# Patient Record
Sex: FEMALE | Race: WHITE | NOT HISPANIC OR LATINO
[De-identification: names, ages, dates, MRNs, and addresses within clinical notes are randomized per-mention and may not be internally consistent; named-entity substitution may affect disease eponyms.]

---

## 2021-01-06 ENCOUNTER — APPOINTMENT (OUTPATIENT)
Dept: ENDOCRINOLOGY | Facility: CLINIC | Age: 66
End: 2021-01-06
Payer: COMMERCIAL

## 2021-01-06 VITALS
BODY MASS INDEX: 22.15 KG/M2 | RESPIRATION RATE: 17 BRPM | HEART RATE: 119 BPM | TEMPERATURE: 98.2 F | OXYGEN SATURATION: 98 % | DIASTOLIC BLOOD PRESSURE: 70 MMHG | HEIGHT: 63 IN | SYSTOLIC BLOOD PRESSURE: 110 MMHG | WEIGHT: 125 LBS

## 2021-01-06 LAB — GLUCOSE BLDC GLUCOMTR-MCNC: 484

## 2021-01-06 PROCEDURE — 99205 OFFICE O/P NEW HI 60 MIN: CPT | Mod: 25

## 2021-01-06 PROCEDURE — 99072 ADDL SUPL MATRL&STAF TM PHE: CPT

## 2021-01-06 PROCEDURE — 82962 GLUCOSE BLOOD TEST: CPT

## 2021-01-06 PROCEDURE — 36415 COLL VENOUS BLD VENIPUNCTURE: CPT

## 2021-01-06 NOTE — HISTORY OF PRESENT ILLNESS
[FreeTextEntry1] : HISTORY OF PRESENT ILLNESS. \par \par Ms. HERMAN was diagnosed with Diabetes Mellitus Type 2 for more than 30 years. \par She reports history HTN, dyslipidemia, and denies CAD,  known complications of retinopathy, nephropathy, or neuropathy. \par Previously under care of [facility], and as per records she used to take Levemir, Victoza, Prandin\par She is presently on Admelog 3 un tid, Metformin 500 mg bid, Tradjenta 5 mg qd, Zocor 10 mg\par She stopped checking her blood sugars at home. \par Fingerstick glucose in the office today is 484 mg/dL 4 hours after eating. \par Diet: not following ADA\par Exercise: walking\par \par Lab review: no recent labs\par \par Last dilated eye - 2020\par Last podiatry visit  - none\par Last cardiology evaluation -none\par Last stress test - none\par Last 2-D Echo - none\par Last nephrology evaluation - none\par Last neurology evaluation-none\par

## 2021-01-06 NOTE — ASSESSMENT
[Diabetes Foot Care] : diabetes foot care [Long Term Vascular Complications] : long term vascular complications of diabetes [Carbohydrate Consistent Diet] : carbohydrate consistent diet [Importance of Diet and Exercise] : importance of diet and exercise to improve glycemic control, achieve weight loss and improve cardiovascular health [Exercise/Effect on Glucose] : exercise/effect on glucose [Hypoglycemia Management] : hypoglycemia management [Glucagon Use] : glucagon use [Ketone Testing] : ketone testing [Action and use of Insulin] : action and use of short and long-acting insulin [Self Monitoring of Blood Glucose] : self monitoring of blood glucose [Insulin Self-Administration] : insulin self-administration [Injection Technique, Storage, Sharps Disposal] : injection technique, storage, and sharps disposal [Sick-Day Management] : sick-day management [Retinopathy Screening] : Patient was referred to ophthalmology for retinopathy screening [Diabetic Medications] : Risks and benefits of diabetic medications were discussed [FreeTextEntry1] : Current approaches to diabetes management are discussed with the patient. \par Target ranges for blood sugar, blood pressure and cholesterol reviewed, and risk reduction strategies verified. \par Hypoglycemia precautions reviewed with the patient. \par Suggested extensive diabetes education program, including nutritional and diabetes teaching and evaluation. \par Proper dietary restrictions and exercise routines discussed. \par Glucometer/SMBG and log book charting discussed.\par - Admelog 7 un x 1 stat\par - Basaglar 14 un HS\par - Admelog  tid ac (0-0-2-9-8-6-10-11-12)\par - continue metformin 500 mg bid for now, until the results of blood work are back. Will likely uptitrate the dose after\par - Tradjenta 5 mg qd\par - will reconsider other diabetic classes post labs\par - apply for [facility] and see CDE\par - continue Zocor 10 mg HS\par - thyroid US\par - obtain prior DXA records (done in 2020)\par RTC 3 months, to see CDE in between

## 2021-01-07 LAB
25(OH)D3 SERPL-MCNC: 27.8 NG/ML
ALBUMIN SERPL ELPH-MCNC: 4.6 G/DL
ALP BLD-CCNC: 144 U/L
ALT SERPL-CCNC: 9 U/L
ANION GAP SERPL CALC-SCNC: 18 MMOL/L
AST SERPL-CCNC: 10 U/L
BASOPHILS # BLD AUTO: 0.08 K/UL
BASOPHILS NFR BLD AUTO: 0.9 %
BILIRUB SERPL-MCNC: 0.2 MG/DL
BUN SERPL-MCNC: 31 MG/DL
C PEPTIDE SERPL-MCNC: 0.8 NG/ML
CALCIUM SERPL-MCNC: 10.2 MG/DL
CHLORIDE SERPL-SCNC: 93 MMOL/L
CHOLEST SERPL-MCNC: 224 MG/DL
CO2 SERPL-SCNC: 25 MMOL/L
CREAT SERPL-MCNC: 1.34 MG/DL
CREAT SPEC-SCNC: 60 MG/DL
EOSINOPHIL # BLD AUTO: 0.08 K/UL
EOSINOPHIL NFR BLD AUTO: 0.9 %
ESTIMATED AVERAGE GLUCOSE: >398 MG/DL
FOLATE SERPL-MCNC: 14.3 NG/ML
FRUCTOSAMINE SERPL-MCNC: 911 UMOL/L
GLUCOSE SERPL-MCNC: 477 MG/DL
HBA1C MFR BLD HPLC: >15.5 %
HCT VFR BLD CALC: 40.8 %
HDLC SERPL-MCNC: 69 MG/DL
HGB BLD-MCNC: 13.3 G/DL
IMM GRANULOCYTES NFR BLD AUTO: 0.2 %
LDLC SERPL CALC-MCNC: 99 MG/DL
LYMPHOCYTES # BLD AUTO: 1.51 K/UL
LYMPHOCYTES NFR BLD AUTO: 17.6 %
MAN DIFF?: NORMAL
MCHC RBC-ENTMCNC: 30.8 PG
MCHC RBC-ENTMCNC: 32.6 GM/DL
MCV RBC AUTO: 94.4 FL
MICROALBUMIN 24H UR DL<=1MG/L-MCNC: <1.2 MG/DL
MICROALBUMIN/CREAT 24H UR-RTO: NORMAL MG/G
MONOCYTES # BLD AUTO: 0.67 K/UL
MONOCYTES NFR BLD AUTO: 7.8 %
NEUTROPHILS # BLD AUTO: 6.22 K/UL
NEUTROPHILS NFR BLD AUTO: 72.6 %
NONHDLC SERPL-MCNC: 154 MG/DL
PLATELET # BLD AUTO: 274 K/UL
POTASSIUM SERPL-SCNC: 3.9 MMOL/L
PROT SERPL-MCNC: 7.3 G/DL
RBC # BLD: 4.32 M/UL
RBC # FLD: 13.1 %
SODIUM SERPL-SCNC: 136 MMOL/L
T4 FREE SERPL-MCNC: 1.2 NG/DL
THYROGLOB AB SERPL-ACNC: <20 IU/ML
THYROPEROXIDASE AB SERPL IA-ACNC: 13.1 IU/ML
TRIGL SERPL-MCNC: 276 MG/DL
TSH SERPL-ACNC: 1.31 UIU/ML
VIT B12 SERPL-MCNC: 723 PG/ML
WBC # FLD AUTO: 8.58 K/UL

## 2021-01-10 LAB — PANC ISLET CELL AB SER QL: NORMAL

## 2021-01-11 LAB — GAD65 AB SER-MCNC: 0.01 NMOL/L

## 2021-01-12 RX ORDER — INSULIN GLARGINE 100 [IU]/ML
100 INJECTION, SOLUTION SUBCUTANEOUS
Qty: 3 | Refills: 5 | Status: DISCONTINUED | COMMUNITY
Start: 2021-01-06 | End: 2021-01-12

## 2021-01-15 LAB — ZINC TRANSPORTER 8 AB: <15 U/ML

## 2021-01-27 ENCOUNTER — APPOINTMENT (OUTPATIENT)
Dept: ENDOCRINOLOGY | Facility: CLINIC | Age: 66
End: 2021-01-27
Payer: COMMERCIAL

## 2021-01-27 PROCEDURE — 99072 ADDL SUPL MATRL&STAF TM PHE: CPT

## 2021-01-27 PROCEDURE — G0108 DIAB MANAGE TRN  PER INDIV: CPT

## 2021-02-02 ENCOUNTER — APPOINTMENT (OUTPATIENT)
Dept: ENDOCRINOLOGY | Facility: CLINIC | Age: 66
End: 2021-02-02

## 2021-02-22 ENCOUNTER — NON-APPOINTMENT (OUTPATIENT)
Age: 66
End: 2021-02-22

## 2021-03-04 ENCOUNTER — NON-APPOINTMENT (OUTPATIENT)
Age: 66
End: 2021-03-04

## 2021-03-19 RX ORDER — INSULIN LISPRO 100 U/ML
100 INJECTION, SOLUTION INTRAVENOUS; SUBCUTANEOUS 3 TIMES DAILY
Refills: 0 | Status: DISCONTINUED | COMMUNITY
End: 2021-03-19

## 2021-04-13 ENCOUNTER — APPOINTMENT (OUTPATIENT)
Dept: ENDOCRINOLOGY | Facility: CLINIC | Age: 66
End: 2021-04-13
Payer: COMMERCIAL

## 2021-04-13 VITALS
TEMPERATURE: 98 F | WEIGHT: 125 LBS | RESPIRATION RATE: 17 BRPM | HEART RATE: 97 BPM | BODY MASS INDEX: 22.15 KG/M2 | DIASTOLIC BLOOD PRESSURE: 60 MMHG | SYSTOLIC BLOOD PRESSURE: 140 MMHG | OXYGEN SATURATION: 98 % | HEIGHT: 63 IN

## 2021-04-13 LAB — GLUCOSE BLDC GLUCOMTR-MCNC: 106

## 2021-04-13 PROCEDURE — 99214 OFFICE O/P EST MOD 30 MIN: CPT | Mod: 25

## 2021-04-13 PROCEDURE — 36415 COLL VENOUS BLD VENIPUNCTURE: CPT

## 2021-04-13 PROCEDURE — 82962 GLUCOSE BLOOD TEST: CPT

## 2021-04-13 PROCEDURE — 99072 ADDL SUPL MATRL&STAF TM PHE: CPT

## 2021-04-13 NOTE — HISTORY OF PRESENT ILLNESS
[FreeTextEntry1] : F/u for diabetes management\par \par *** Apr 13, 2021 ***\par \par feels fine, no c/o\par on Lantus 10 un HS,  Admelog ac B (3-4-5-5-0-8-9-10-11),  ac L+D  (7-4-0-5-9-1-10-11-12), Tradjenta 5 mg, metformin 500 mg bid\par \par log: fbs- , ac L-  , ac D- 126-173 (few episodes of upper 200's- low 300's)\par with few subjective hypo's at night\par \par Thyr US (1/23/21)- normal\par \par HISTORY OF PRESENT ILLNESS. \par \par Ms. HERMAN was diagnosed with Diabetes Mellitus Type 2 for more than 30 years. \par She reports history HTN, dyslipidemia, and denies CAD,  known complications of retinopathy, nephropathy, or neuropathy. \par Previously under care of [facility], and as per records she used to take Levemir, Victoza, Prandin\par She is presently on Admelog 3 un tid, Metformin 500 mg bid, Tradjenta 5 mg qd, Zocor 10 mg\par She stopped checking her blood sugars at home. \par Fingerstick glucose in the office today is 484 mg/dL 4 hours after eating. \par Diet: not following ADA\par Exercise: walking\par \par Lab review: no recent labs\par \par Last dilated eye - 2020\par Last podiatry visit  - none\par Last cardiology evaluation -none\par Last stress test - none\par Last 2-D Echo - none\par Last nephrology evaluation - none\par Last neurology evaluation-none\par

## 2021-04-13 NOTE — ASSESSMENT
[Diabetes Foot Care] : diabetes foot care [Long Term Vascular Complications] : long term vascular complications of diabetes [Carbohydrate Consistent Diet] : carbohydrate consistent diet [Importance of Diet and Exercise] : importance of diet and exercise to improve glycemic control, achieve weight loss and improve cardiovascular health [Exercise/Effect on Glucose] : exercise/effect on glucose [Hypoglycemia Management] : hypoglycemia management [Glucagon Use] : glucagon use [Ketone Testing] : ketone testing [Action and use of Insulin] : action and use of short and long-acting insulin [Self Monitoring of Blood Glucose] : self monitoring of blood glucose [Insulin Self-Administration] : insulin self-administration [Injection Technique, Storage, Sharps Disposal] : injection technique, storage, and sharps disposal [Sick-Day Management] : sick-day management [Retinopathy Screening] : Patient was referred to ophthalmology for retinopathy screening [Diabetic Medications] : Risks and benefits of diabetic medications were discussed [FreeTextEntry1] : DM2, uncontrolled. Minimal pancreatic reserve\par - decrease Lantus 8 un HS\par - change Admelog ac B (3-4-4-1-9-8-8-9-10), ac L (3-3-3-8-9-10-11-12-13), ac D (3-4-5-4-1-4-9-10-11)\par - continue metformin 500 mg bid for now. Might uptitrate the dose eventually\par - Tradjenta 5 mg qd\par - continue Zocor 10 mg HS\par - obtain prior DXA records (done in 2020)\par RTC 3 months, to see CDE in between

## 2021-04-16 ENCOUNTER — TRANSCRIPTION ENCOUNTER (OUTPATIENT)
Age: 66
End: 2021-04-16

## 2021-04-16 LAB
25(OH)D3 SERPL-MCNC: 22.4 NG/ML
ALBUMIN SERPL ELPH-MCNC: 4.4 G/DL
ALP BLD-CCNC: 124 U/L
ALT SERPL-CCNC: 12 U/L
ANION GAP SERPL CALC-SCNC: 12 MMOL/L
AST SERPL-CCNC: 18 U/L
BILIRUB SERPL-MCNC: 0.2 MG/DL
BUN SERPL-MCNC: 37 MG/DL
C PEPTIDE SERPL-MCNC: 0.3 NG/ML
CALCIUM SERPL-MCNC: 9.6 MG/DL
CHLORIDE SERPL-SCNC: 102 MMOL/L
CHOLEST SERPL-MCNC: 157 MG/DL
CO2 SERPL-SCNC: 24 MMOL/L
CREAT SERPL-MCNC: 1.13 MG/DL
ESTIMATED AVERAGE GLUCOSE: 163 MG/DL
FRUCTOSAMINE SERPL-MCNC: 298 UMOL/L
GLUCOSE SERPL-MCNC: 108 MG/DL
HBA1C MFR BLD HPLC: 7.3 %
HDLC SERPL-MCNC: 77 MG/DL
LDLC SERPL CALC-MCNC: 64 MG/DL
NONHDLC SERPL-MCNC: 81 MG/DL
POTASSIUM SERPL-SCNC: 5 MMOL/L
PROT SERPL-MCNC: 7.3 G/DL
SODIUM SERPL-SCNC: 139 MMOL/L
TRIGL SERPL-MCNC: 83 MG/DL
TSH SERPL-ACNC: 2.09 UIU/ML

## 2021-07-09 ENCOUNTER — RX RENEWAL (OUTPATIENT)
Age: 66
End: 2021-07-09

## 2021-08-17 ENCOUNTER — APPOINTMENT (OUTPATIENT)
Dept: ENDOCRINOLOGY | Facility: CLINIC | Age: 66
End: 2021-08-17
Payer: COMMERCIAL

## 2021-08-17 VITALS
OXYGEN SATURATION: 98 % | DIASTOLIC BLOOD PRESSURE: 70 MMHG | BODY MASS INDEX: 24.98 KG/M2 | HEART RATE: 87 BPM | RESPIRATION RATE: 17 BRPM | SYSTOLIC BLOOD PRESSURE: 140 MMHG | WEIGHT: 141 LBS | HEIGHT: 63 IN

## 2021-08-17 LAB — GLUCOSE BLDC GLUCOMTR-MCNC: 59

## 2021-08-17 PROCEDURE — 99214 OFFICE O/P EST MOD 30 MIN: CPT | Mod: 25

## 2021-08-17 PROCEDURE — 82962 GLUCOSE BLOOD TEST: CPT

## 2021-08-17 PROCEDURE — 36415 COLL VENOUS BLD VENIPUNCTURE: CPT

## 2021-08-17 RX ORDER — GLUCAGON 1 MG
1 KIT INJECTION
Qty: 2 | Refills: 1 | Status: ACTIVE | COMMUNITY
Start: 2021-08-17 | End: 1900-01-01

## 2021-08-18 ENCOUNTER — TRANSCRIPTION ENCOUNTER (OUTPATIENT)
Age: 66
End: 2021-08-18

## 2021-08-18 LAB
25(OH)D3 SERPL-MCNC: 51.1 NG/ML
ALBUMIN SERPL ELPH-MCNC: 4.3 G/DL
ALP BLD-CCNC: 114 U/L
ALT SERPL-CCNC: 10 U/L
ANION GAP SERPL CALC-SCNC: 21 MMOL/L
AST SERPL-CCNC: 25 U/L
BILIRUB SERPL-MCNC: 0.2 MG/DL
BUN SERPL-MCNC: 35 MG/DL
CALCIUM SERPL-MCNC: 9.6 MG/DL
CHLORIDE SERPL-SCNC: 103 MMOL/L
CO2 SERPL-SCNC: 20 MMOL/L
CREAT SERPL-MCNC: 1.23 MG/DL
ESTIMATED AVERAGE GLUCOSE: 143 MG/DL
FRUCTOSAMINE SERPL-MCNC: 287 UMOL/L
GLUCOSE SERPL-MCNC: 33 MG/DL
HBA1C MFR BLD HPLC: 6.6 %
POTASSIUM SERPL-SCNC: 4.5 MMOL/L
PROT SERPL-MCNC: 7.2 G/DL
SODIUM SERPL-SCNC: 144 MMOL/L

## 2021-08-18 NOTE — ASSESSMENT
[Diabetes Foot Care] : diabetes foot care [Long Term Vascular Complications] : long term vascular complications of diabetes [Importance of Diet and Exercise] : importance of diet and exercise to improve glycemic control, achieve weight loss and improve cardiovascular health [Carbohydrate Consistent Diet] : carbohydrate consistent diet [Exercise/Effect on Glucose] : exercise/effect on glucose [Glucagon Use] : glucagon use [Hypoglycemia Management] : hypoglycemia management [Ketone Testing] : ketone testing [Action and use of Insulin] : action and use of short and long-acting insulin [Self Monitoring of Blood Glucose] : self monitoring of blood glucose [Insulin Self-Administration] : insulin self-administration [Injection Technique, Storage, Sharps Disposal] : injection technique, storage, and sharps disposal [Sick-Day Management] : sick-day management [Retinopathy Screening] : Patient was referred to ophthalmology for retinopathy screening [Diabetic Medications] : Risks and benefits of diabetic medications were discussed [FreeTextEntry1] : DM2, suboptimally controlled. Minimal pancreatic reserve\par - decrease Lantus 8 un HS\par - change Admelog ac B (3-3-4-4-5-6-7-8-9), ac L (0-7-0-9-10-11-12-13-14), ac D (2-3-4-0-1-0-8-9-10)\par - continue metformin 500 mg bid for now. Might uptitrate the dose eventually\par - Tradjenta 5 mg qd\par - continue Zocor 10 mg HS\par - obtain prior DXA records (done in 2020)\par - glucose tabs, glucagon kit. hypo is treated with glucose tabs\par RTC 3 months, to see CDE in between

## 2021-08-18 NOTE — HISTORY OF PRESENT ILLNESS
[FreeTextEntry1] : F/u for diabetes management\par \par *** Aug 17, 2021 ***\par \par on Lantus 6 to 8 un HS,  Admelog ac B (3-4-4-9-4-9-8-9-10), ac L (6-9-7-8-9-10-11-12-13), ac D (3-4-5-0-2-3-9-10-11), Tradjenta 5 mg, metformin 500 mg bid\par \par log: fbs- , ac L- , ac D- 101-223\par \par *** Apr 13, 2021 ***\par \par feels fine, no c/o\par on Lantus 10 un HS,  Admelog ac B (3-4-5-1-1-5-9-10-11),  ac L+D  (2-3-7-7-5-6-10-11-12), Tradjenta 5 mg, metformin 500 mg bid\par \par log: fbs- , ac L-  , ac D- 126-173 (few episodes of upper 200's- low 300's)\par with few subjective hypo's at night\par \par Thyr US (1/23/21)- normal\par \par HISTORY OF PRESENT ILLNESS. \par \par Ms. HERMAN was diagnosed with Diabetes Mellitus Type 2 for more than 30 years. \par She reports history HTN, dyslipidemia, and denies CAD,  known complications of retinopathy, nephropathy, or neuropathy. \par Previously under care of [facility], and as per records she used to take Levemir, Victoza, Prandin\par She is presently on Admelog 3 un tid, Metformin 500 mg bid, Tradjenta 5 mg qd, Zocor 10 mg\par She stopped checking her blood sugars at home. \par Fingerstick glucose in the office today is 484 mg/dL 4 hours after eating. \par Diet: not following ADA\par Exercise: walking\par \par Lab review: no recent labs\par \par Last dilated eye - 2020\par Last podiatry visit  - none\par Last cardiology evaluation -none\par Last stress test - none\par Last 2-D Echo - none\par Last nephrology evaluation - none\par Last neurology evaluation-none\par

## 2021-10-22 ENCOUNTER — RX RENEWAL (OUTPATIENT)
Age: 66
End: 2021-10-22

## 2022-01-01 RX ORDER — INSULIN LISPRO 100 U/ML
100 INJECTION, SOLUTION SUBCUTANEOUS
Qty: 3 | Refills: 5 | Status: DISCONTINUED | COMMUNITY
Start: 2021-01-06 | End: 2022-01-01

## 2022-02-07 ENCOUNTER — RX RENEWAL (OUTPATIENT)
Age: 67
End: 2022-02-07

## 2022-02-09 ENCOUNTER — APPOINTMENT (OUTPATIENT)
Dept: ENDOCRINOLOGY | Facility: CLINIC | Age: 67
End: 2022-02-09
Payer: COMMERCIAL

## 2022-02-09 VITALS
WEIGHT: 145 LBS | HEIGHT: 63 IN | TEMPERATURE: 98 F | OXYGEN SATURATION: 97 % | HEART RATE: 98 BPM | SYSTOLIC BLOOD PRESSURE: 140 MMHG | BODY MASS INDEX: 25.69 KG/M2 | DIASTOLIC BLOOD PRESSURE: 70 MMHG | RESPIRATION RATE: 17 BRPM

## 2022-02-09 LAB — GLUCOSE BLDC GLUCOMTR-MCNC: 172

## 2022-02-09 PROCEDURE — 36415 COLL VENOUS BLD VENIPUNCTURE: CPT

## 2022-02-09 PROCEDURE — 82962 GLUCOSE BLOOD TEST: CPT

## 2022-02-09 PROCEDURE — 99214 OFFICE O/P EST MOD 30 MIN: CPT | Mod: 25

## 2022-02-09 NOTE — HISTORY OF PRESENT ILLNESS
[FreeTextEntry1] : F/u for diabetes management\par \par *** Feb 09, 2022 ***\par \par feels great, no new c/o. diet is worse past few months\par taking  Lantus 7 un HS, Admelog ac B (3-3-4-4-5-6-7-8-9), ac L (1-1-5-9-10-11-12-13-14), ac D (2-3-4-6-2-4-8-9-10), metformin 500 mg bid,  Tradjenta 5 mg qd,  Zocor 10 mg HS\par reports fbs- , ac L- , ac D- \par \par *** Aug 17, 2021 ***\par \par on Lantus 6 to 8 un HS,  Admelog ac B (3-4-4-1-8-7-8-9-10), ac L (6-1-4-8-9-10-11-12-13), ac D (3-4-5-1-5-7-9-10-11), Tradjenta 5 mg, metformin 500 mg bid\par \par log: fbs- , ac L- , ac D- 101-223\par \par *** Apr 13, 2021 ***\par \par feels fine, no c/o\par on Lantus 10 un HS,  Admelog ac B (3-4-5-6-7-6-9-10-11),  ac L+D  (5-3-9-2-5-7-10-11-12), Tradjenta 5 mg, metformin 500 mg bid\par \par log: fbs- , ac L-  , ac D- 126-173 (few episodes of upper 200's- low 300's)\par with few subjective hypo's at night\par \par Thyr  (1/23/21)- normal\par \par HISTORY OF PRESENT ILLNESS. \par \par Ms. HERMAN was diagnosed with Diabetes Mellitus Type 2 for more than 30 years. \par She reports history HTN, dyslipidemia, and denies CAD,  known complications of retinopathy, nephropathy, or neuropathy. \par Previously under care of [facility], and as per records she used to take Levemir, Victoza, Prandin\par She is presently on Admelog 3 un tid, Metformin 500 mg bid, Tradjenta 5 mg qd, Zocor 10 mg\par She stopped checking her blood sugars at home. \par Fingerstick glucose in the office today is 484 mg/dL 4 hours after eating. \par Diet: not following ADA\par Exercise: walking\par \par Lab review: no recent labs\par \par Last dilated eye - 2020\par Last podiatry visit  - none\par Last cardiology evaluation -none\par Last stress test - none\par Last 2-D Echo - none\par Last nephrology evaluation - none\par Last neurology evaluation-none\par

## 2022-02-09 NOTE — ASSESSMENT
[Diabetes Foot Care] : diabetes foot care [Long Term Vascular Complications] : long term vascular complications of diabetes [Carbohydrate Consistent Diet] : carbohydrate consistent diet [Importance of Diet and Exercise] : importance of diet and exercise to improve glycemic control, achieve weight loss and improve cardiovascular health [Exercise/Effect on Glucose] : exercise/effect on glucose [Hypoglycemia Management] : hypoglycemia management [Glucagon Use] : glucagon use [Ketone Testing] : ketone testing [Action and use of Insulin] : action and use of short and long-acting insulin [Self Monitoring of Blood Glucose] : self monitoring of blood glucose [Insulin Self-Administration] : insulin self-administration [Injection Technique, Storage, Sharps Disposal] : injection technique, storage, and sharps disposal [Sick-Day Management] : sick-day management [Retinopathy Screening] : Patient was referred to ophthalmology for retinopathy screening [Diabetic Medications] : Risks and benefits of diabetic medications were discussed [FreeTextEntry1] : DM2, suboptimally controlled. Minimal pancreatic reserve\par - decrease Lantus 8 un HS\par - change Admelog ac B (3-3-4-4-5-6-7-8-9), ac L (3-1-9-9-10-11-12-13-14), ac D (2-3-4-6-1-3-8-9-10)\par - continue metformin 500 mg bid for now. Might uptitrate the dose eventually\par - Tradjenta 5 mg qd\par - we discussed CGMS and pumps- she's reluctant at present (taking care of a son with autism, who might rip things off her body)\par - continue Zocor 10 mg HS\par - obtain prior DXA records (done in 2020)\par - glucose tabs, glucagon kit. \par RTC 3 months, to see CDE in between

## 2022-02-11 LAB
25(OH)D3 SERPL-MCNC: 47.7 NG/ML
ALBUMIN SERPL ELPH-MCNC: 4.5 G/DL
ALP BLD-CCNC: 110 U/L
ALT SERPL-CCNC: 13 U/L
ANION GAP SERPL CALC-SCNC: 17 MMOL/L
AST SERPL-CCNC: 20 U/L
BILIRUB SERPL-MCNC: 0.2 MG/DL
BUN SERPL-MCNC: 39 MG/DL
CALCIUM SERPL-MCNC: 9.9 MG/DL
CHLORIDE SERPL-SCNC: 97 MMOL/L
CHOLEST SERPL-MCNC: 158 MG/DL
CO2 SERPL-SCNC: 25 MMOL/L
CREAT SERPL-MCNC: 1.25 MG/DL
CREAT SPEC-SCNC: 57 MG/DL
ESTIMATED AVERAGE GLUCOSE: 148 MG/DL
FOLATE SERPL-MCNC: 13.7 NG/ML
FRUCTOSAMINE SERPL-MCNC: 290 UMOL/L
GLUCOSE SERPL-MCNC: 152 MG/DL
GLYCOMARK.: 10.9 UG/ML
HBA1C MFR BLD HPLC: 6.8 %
HDLC SERPL-MCNC: 72 MG/DL
LDLC SERPL CALC-MCNC: 67 MG/DL
MICROALBUMIN 24H UR DL<=1MG/L-MCNC: <1.2 MG/DL
MICROALBUMIN/CREAT 24H UR-RTO: NORMAL MG/G
NONHDLC SERPL-MCNC: 85 MG/DL
POTASSIUM SERPL-SCNC: 5.4 MMOL/L
PROT SERPL-MCNC: 7.3 G/DL
SODIUM SERPL-SCNC: 138 MMOL/L
T4 FREE SERPL-MCNC: 1.5 NG/DL
TRIGL SERPL-MCNC: 91 MG/DL
TSH SERPL-ACNC: 1.84 UIU/ML
VIT B12 SERPL-MCNC: 369 PG/ML

## 2022-04-11 ENCOUNTER — RX RENEWAL (OUTPATIENT)
Age: 67
End: 2022-04-11

## 2022-06-03 ENCOUNTER — RESULT CHARGE (OUTPATIENT)
Age: 67
End: 2022-06-03

## 2022-06-03 ENCOUNTER — APPOINTMENT (OUTPATIENT)
Dept: ENDOCRINOLOGY | Facility: CLINIC | Age: 67
End: 2022-06-03
Payer: COMMERCIAL

## 2022-06-03 VITALS
WEIGHT: 140 LBS | DIASTOLIC BLOOD PRESSURE: 85 MMHG | TEMPERATURE: 98 F | SYSTOLIC BLOOD PRESSURE: 150 MMHG | OXYGEN SATURATION: 97 % | BODY MASS INDEX: 24.8 KG/M2 | HEIGHT: 63 IN | HEART RATE: 95 BPM

## 2022-06-03 LAB — GLUCOSE BLDC GLUCOMTR-MCNC: 93

## 2022-06-03 PROCEDURE — 36415 COLL VENOUS BLD VENIPUNCTURE: CPT

## 2022-06-03 PROCEDURE — 99214 OFFICE O/P EST MOD 30 MIN: CPT | Mod: 25

## 2022-06-03 PROCEDURE — 82962 GLUCOSE BLOOD TEST: CPT

## 2022-06-03 NOTE — ASSESSMENT
[Diabetes Foot Care] : diabetes foot care [Long Term Vascular Complications] : long term vascular complications of diabetes [Carbohydrate Consistent Diet] : carbohydrate consistent diet [Importance of Diet and Exercise] : importance of diet and exercise to improve glycemic control, achieve weight loss and improve cardiovascular health [Exercise/Effect on Glucose] : exercise/effect on glucose [Hypoglycemia Management] : hypoglycemia management [Glucagon Use] : glucagon use [Ketone Testing] : ketone testing [Action and use of Insulin] : action and use of short and long-acting insulin [Self Monitoring of Blood Glucose] : self monitoring of blood glucose [Insulin Self-Administration] : insulin self-administration [Injection Technique, Storage, Sharps Disposal] : injection technique, storage, and sharps disposal [Sick-Day Management] : sick-day management [Retinopathy Screening] : Patient was referred to ophthalmology for retinopathy screening [Diabetic Medications] : Risks and benefits of diabetic medications were discussed [FreeTextEntry1] : DM2, suboptimally controlled. Minimal pancreatic reserve\par - Lantus 8 un HS\par - change Admelog ac B (3-3-4-4-5-6-7-8-9), ac L (7-4-3-8-9-10-11-12-13), ac D (2-3-4-0-6-9-8-9-10)\par - continue metformin 500 mg bid for now. Might uptitrate the dose eventually\par - Tradjenta 5 mg qd\par - we discussed CGMS and pumps- she's reluctant at present (taking care of a son with autism, who might rip things off her body)\par - continue Zocor 10 mg HS\par - obtain prior DXA records (done in 2020)\par - glucose tabs, glucagon kit. \par RTC 3 months, to see CDE in between

## 2022-06-03 NOTE — HISTORY OF PRESENT ILLNESS
[FreeTextEntry1] : F/u for diabetes management\par \par *** Jun 03, 2022 ***\par \par feels fine. under stress - son had a kidney infection, requiring a temporary HD\par taking Lantus 7 un HS,  Admelog ac B (3-3-4-4-5-6-7-8-9), ac L (6-3-4-9-10-11-12-13-14), ac D (2-3-4-6-0-2-8-9-10), metformin 1000 mg bid,  Tradjenta 5 mg qd,  Zocor 10 mg HS\par \par log: fbs-112-174, ac D- \par \par *** Feb 09, 2022 ***\par \par feels great, no new c/o. diet is worse past few months\par taking  Lantus 7 un HS, Admelog ac B (3-3-4-4-5-6-7-8-9), ac L (9-5-3-9-10-11-12-13-14), ac D (2-3-4-3-2-5-8-9-10), metformin 500 mg bid,  Tradjenta 5 mg qd,  Zocor 10 mg HS\par reports fbs- , ac L- , ac D- \par \par *** Aug 17, 2021 ***\par \par on Lantus 6 to 8 un HS,  Admelog ac B (3-4-4-9-1-1-8-9-10), ac L (0-7-8-8-9-10-11-12-13), ac D (3-4-5-9-3-1-9-10-11), Tradjenta 5 mg, metformin 500 mg bid\par \par log: fbs- , ac L- , ac D- 101-223\par \par *** Apr 13, 2021 ***\par \par feels fine, no c/o\par on Lantus 10 un HS,  Admelog ac B (3-4-5-9-8-1-9-10-11),  ac L+D  (8-0-1-6-1-0-10-11-12), Tradjenta 5 mg, metformin 500 mg bid\par \par log: fbs- , ac L-  , ac D- 126-173 (few episodes of upper 200's- low 300's)\par with few subjective hypo's at night\par \par Thyr US (1/23/21)- normal\par \par HISTORY OF PRESENT ILLNESS. \par \par Ms. HERMAN was diagnosed with Diabetes Mellitus Type 2 for more than 30 years. \par She reports history HTN, dyslipidemia, and denies CAD,  known complications of retinopathy, nephropathy, or neuropathy. \par Previously under [facility] of [facility], and as per records she used to take Levemir, Victoza, Prandin\par She is presently on Admelog 3 un tid, Metformin 500 mg bid, Tradjenta 5 mg qd, Zocor 10 mg\par She stopped checking her blood sugars at home. \par Fingerstick glucose in the office today is 484 mg/dL 4 hours after eating. \par Diet: not following ADA\par Exercise: walking\par \par Lab review: no recent labs\par \par Last dilated eye - 2020\par Last podiatry visit  - none\par Last cardiology evaluation -none\par Last stress test - none\par Last 2-D Echo - none\par Last nephrology evaluation - none\par Last neurology evaluation-none\par

## 2022-06-05 ENCOUNTER — TRANSCRIPTION ENCOUNTER (OUTPATIENT)
Age: 67
End: 2022-06-05

## 2022-06-05 LAB
25(OH)D3 SERPL-MCNC: 50.4 NG/ML
ALBUMIN SERPL ELPH-MCNC: 4.5 G/DL
ALP BLD-CCNC: 98 U/L
ALT SERPL-CCNC: 10 U/L
ANION GAP SERPL CALC-SCNC: 15 MMOL/L
AST SERPL-CCNC: 17 U/L
BILIRUB SERPL-MCNC: 0.2 MG/DL
BUN SERPL-MCNC: 31 MG/DL
CALCIUM SERPL-MCNC: 9.8 MG/DL
CHLORIDE SERPL-SCNC: 103 MMOL/L
CHOLEST SERPL-MCNC: 143 MG/DL
CO2 SERPL-SCNC: 24 MMOL/L
CREAT SERPL-MCNC: 1.25 MG/DL
CREAT SPEC-SCNC: 138 MG/DL
EGFR: 48 ML/MIN/1.73M2
ESTIMATED AVERAGE GLUCOSE: 154 MG/DL
FOLATE SERPL-MCNC: 14.9 NG/ML
FRUCTOSAMINE SERPL-MCNC: 277 UMOL/L
GLUCOSE SERPL-MCNC: 92 MG/DL
HBA1C MFR BLD HPLC: 7 %
HDLC SERPL-MCNC: 70 MG/DL
LDLC SERPL CALC-MCNC: 53 MG/DL
MICROALBUMIN 24H UR DL<=1MG/L-MCNC: <1.2 MG/DL
MICROALBUMIN/CREAT 24H UR-RTO: NORMAL MG/G
NONHDLC SERPL-MCNC: 73 MG/DL
POTASSIUM SERPL-SCNC: 4.4 MMOL/L
PROT SERPL-MCNC: 7.2 G/DL
SODIUM SERPL-SCNC: 142 MMOL/L
T4 FREE SERPL-MCNC: 1.4 NG/DL
TRIGL SERPL-MCNC: 100 MG/DL
TSH SERPL-ACNC: 1.51 UIU/ML
VIT B12 SERPL-MCNC: 292 PG/ML

## 2022-07-06 ENCOUNTER — TRANSCRIPTION ENCOUNTER (OUTPATIENT)
Age: 67
End: 2022-07-06

## 2022-08-11 ENCOUNTER — TRANSCRIPTION ENCOUNTER (OUTPATIENT)
Age: 67
End: 2022-08-11

## 2022-08-23 ENCOUNTER — TRANSCRIPTION ENCOUNTER (OUTPATIENT)
Age: 67
End: 2022-08-23

## 2022-09-09 ENCOUNTER — TRANSCRIPTION ENCOUNTER (OUTPATIENT)
Age: 67
End: 2022-09-09

## 2022-09-16 ENCOUNTER — TRANSCRIPTION ENCOUNTER (OUTPATIENT)
Age: 67
End: 2022-09-16

## 2022-09-16 RX ORDER — LANCETS 33 GAUGE
EACH MISCELLANEOUS
Qty: 300 | Refills: 0 | Status: COMPLETED | COMMUNITY
Start: 2021-07-07 | End: 2022-12-15

## 2022-09-19 ENCOUNTER — TRANSCRIPTION ENCOUNTER (OUTPATIENT)
Age: 67
End: 2022-09-19

## 2022-09-24 ENCOUNTER — TRANSCRIPTION ENCOUNTER (OUTPATIENT)
Age: 67
End: 2022-09-24

## 2022-09-26 ENCOUNTER — TRANSCRIPTION ENCOUNTER (OUTPATIENT)
Age: 67
End: 2022-09-26

## 2022-09-28 ENCOUNTER — TRANSCRIPTION ENCOUNTER (OUTPATIENT)
Age: 67
End: 2022-09-28

## 2022-10-07 ENCOUNTER — APPOINTMENT (OUTPATIENT)
Dept: ENDOCRINOLOGY | Facility: CLINIC | Age: 67
End: 2022-10-07

## 2022-10-07 VITALS
HEART RATE: 100 BPM | SYSTOLIC BLOOD PRESSURE: 147 MMHG | RESPIRATION RATE: 16 BRPM | OXYGEN SATURATION: 98 % | WEIGHT: 139 LBS | DIASTOLIC BLOOD PRESSURE: 77 MMHG | TEMPERATURE: 98 F | BODY MASS INDEX: 24.63 KG/M2 | HEIGHT: 63 IN

## 2022-10-07 LAB — GLUCOSE BLDC GLUCOMTR-MCNC: 125

## 2022-10-07 PROCEDURE — 36415 COLL VENOUS BLD VENIPUNCTURE: CPT

## 2022-10-07 PROCEDURE — 82962 GLUCOSE BLOOD TEST: CPT

## 2022-10-07 PROCEDURE — 99214 OFFICE O/P EST MOD 30 MIN: CPT | Mod: 25

## 2022-10-07 NOTE — HISTORY OF PRESENT ILLNESS
[FreeTextEntry1] : F/u for diabetes management\par \par *** Oct 07, 2022 ***\par \par doing well. diet is worse. son is feeling better- no more HD\par on  Lantus 8 un HS,  Admelog ac B (3-3-4-4-5-6-7-8-9), ac L (1-6-4-8-9-10-11-12-13), ac D (2-3-4-0-2-2-8-9-10),  metformin 1000 mg bid,  Tradjenta 5 mg qd,  Zocor 10 mg HS\par \par log: fbs-, ac L- , ac D- \par few random hypo's in 70's (mid day)\par \par *** Jun 03, 2022 ***\par \par feels fine. under stress - son had a kidney infection, requiring a temporary HD\par taking Lantus 7 un HS,  Admelog ac B (3-3-4-4-5-6-7-8-9), ac L (8-5-6-9-10-11-12-13-14), ac D (2-3-4-5-0-7-8-9-10), metformin 1000 mg bid,  Tradjenta 5 mg qd,  Zocor 10 mg HS\par \par log: fbs-112-174, ac D- \par \par *** Feb 09, 2022 ***\par \par feels great, no new c/o. diet is worse past few months\par taking  Lantus 7 un HS, Admelog ac B (3-3-4-4-5-6-7-8-9), ac L (5-6-1-9-10-11-12-13-14), ac D (2-3-4-8-5-1-8-9-10), metformin 500 mg bid,  Tradjenta 5 mg qd,  Zocor 10 mg HS\par reports fbs- , ac L- , ac D- \par \par *** Aug 17, 2021 ***\par \par on Lantus 6 to 8 un HS,  Admelog ac B (3-4-4-7-9-1-8-9-10), ac L (8-9-3-8-9-10-11-12-13), ac D (3-4-5-1-5-2-9-10-11), Tradjenta 5 mg, metformin 500 mg bid\par \par log: fbs- , ac L- , ac D- 101-223\par \par *** Apr 13, 2021 ***\par \par feels fine, no c/o\par on Lantus 10 un HS,  Admelog ac B (3-4-5-3-6-9-9-10-11),  ac L+D  (5-4-8-6-9-1-10-11-12), Tradjenta 5 mg, metformin 500 mg bid\par \par log: fbs- , ac L-  , ac D- 126-173 (few episodes of upper 200's- low 300's)\par with few subjective hypo's at night\par \par Thyr US (1/23/21)- normal\par \par HISTORY OF PRESENT ILLNESS. \par \par Ms. HERMAN was diagnosed with Diabetes Mellitus Type 2 for more than 30 years. \par She reports history HTN, dyslipidemia, and denies CAD,  known complications of retinopathy, nephropathy, or neuropathy. \par Previously under [facility] of [facility], and as per records she used to take Levemir, Victoza, Prandin\par She is presently on Admelog 3 un tid, Metformin 500 mg bid, Tradjenta 5 mg qd, Zocor 10 mg\par She stopped checking her blood sugars at home. \par Fingerstick glucose in the office today is 484 mg/dL 4 hours after eating. \par Diet: not following ADA\par Exercise: walking\par \par Lab review: no recent labs\par \par Last dilated eye - 2020\par Last podiatry visit  - none\par Last cardiology evaluation -none\par Last stress test - none\par Last 2-D Echo - none\par Last nephrology evaluation - none\par Last neurology evaluation-none\par

## 2022-10-07 NOTE — ASSESSMENT
[Diabetes Foot Care] : diabetes foot care [Long Term Vascular Complications] : long term vascular complications of diabetes [Carbohydrate Consistent Diet] : carbohydrate consistent diet [Importance of Diet and Exercise] : importance of diet and exercise to improve glycemic control, achieve weight loss and improve cardiovascular health [Exercise/Effect on Glucose] : exercise/effect on glucose [Hypoglycemia Management] : hypoglycemia management [Glucagon Use] : glucagon use [Ketone Testing] : ketone testing [Action and use of Insulin] : action and use of short and long-acting insulin [Self Monitoring of Blood Glucose] : self monitoring of blood glucose [Insulin Self-Administration] : insulin self-administration [Injection Technique, Storage, Sharps Disposal] : injection technique, storage, and sharps disposal [Sick-Day Management] : sick-day management [Retinopathy Screening] : Patient was referred to ophthalmology for retinopathy screening [Diabetic Medications] : Risks and benefits of diabetic medications were discussed [FreeTextEntry1] : DM2, suboptimally controlled. Minimal pancreatic reserve\par - Lantus 8 un HS\par - change Admelog ac B (3-3-4-4-5-6-7-8-9), ac L (5-7-8-8-9-10-11-12-13), ac D (2-3-4-2-9-2-8-9-10)\par - continue metformin 500 mg bid for now. Might uptitrate the dose eventually\par - Tradjenta 5 mg qd\par - we discussed CGMS and pumps- she's reluctant at present (taking care of a son with autism, who might rip things off her body)\par - continue Zocor 10 mg HS\par - obtain prior DXA records (done in 2020)\par - glucose tabs, glucagon kit. \par RTC 3 months, to see CDE in between

## 2022-10-09 ENCOUNTER — TRANSCRIPTION ENCOUNTER (OUTPATIENT)
Age: 67
End: 2022-10-09

## 2022-10-09 LAB
ALBUMIN SERPL ELPH-MCNC: 4.1 G/DL
ALP BLD-CCNC: 104 U/L
ALT SERPL-CCNC: 9 U/L
ANION GAP SERPL CALC-SCNC: 13 MMOL/L
AST SERPL-CCNC: 14 U/L
BILIRUB SERPL-MCNC: 0.2 MG/DL
BUN SERPL-MCNC: 34 MG/DL
CALCIUM SERPL-MCNC: 9.4 MG/DL
CHLORIDE SERPL-SCNC: 104 MMOL/L
CHOLEST SERPL-MCNC: 136 MG/DL
CO2 SERPL-SCNC: 24 MMOL/L
CREAT SERPL-MCNC: 1.09 MG/DL
CREAT SPEC-SCNC: 83 MG/DL
EGFR: 56 ML/MIN/1.73M2
ESTIMATED AVERAGE GLUCOSE: 154 MG/DL
FOLATE SERPL-MCNC: 12.2 NG/ML
FRUCTOSAMINE SERPL-MCNC: 292 UMOL/L
GLUCOSE SERPL-MCNC: 121 MG/DL
GLYCOMARK.: 9.2 UG/ML
HBA1C MFR BLD HPLC: 7 %
HDLC SERPL-MCNC: 69 MG/DL
LDLC SERPL CALC-MCNC: 55 MG/DL
MICROALBUMIN 24H UR DL<=1MG/L-MCNC: <1.2 MG/DL
MICROALBUMIN/CREAT 24H UR-RTO: NORMAL MG/G
NONHDLC SERPL-MCNC: 67 MG/DL
POTASSIUM SERPL-SCNC: 4.7 MMOL/L
PROT SERPL-MCNC: 6.8 G/DL
SODIUM SERPL-SCNC: 141 MMOL/L
TRIGL SERPL-MCNC: 62 MG/DL
TSH SERPL-ACNC: 2.48 UIU/ML
VIT B12 SERPL-MCNC: 327 PG/ML

## 2022-11-09 ENCOUNTER — TRANSCRIPTION ENCOUNTER (OUTPATIENT)
Age: 67
End: 2022-11-09

## 2022-12-27 ENCOUNTER — TRANSCRIPTION ENCOUNTER (OUTPATIENT)
Age: 67
End: 2022-12-27

## 2022-12-29 ENCOUNTER — TRANSCRIPTION ENCOUNTER (OUTPATIENT)
Age: 67
End: 2022-12-29

## 2023-03-10 ENCOUNTER — APPOINTMENT (OUTPATIENT)
Dept: ENDOCRINOLOGY | Facility: CLINIC | Age: 68
End: 2023-03-10
Payer: MEDICARE

## 2023-03-10 ENCOUNTER — RESULT CHARGE (OUTPATIENT)
Age: 68
End: 2023-03-10

## 2023-03-10 VITALS
TEMPERATURE: 97.3 F | RESPIRATION RATE: 16 BRPM | HEIGHT: 63 IN | DIASTOLIC BLOOD PRESSURE: 66 MMHG | WEIGHT: 140 LBS | SYSTOLIC BLOOD PRESSURE: 122 MMHG | OXYGEN SATURATION: 99 % | HEART RATE: 78 BPM | BODY MASS INDEX: 24.8 KG/M2

## 2023-03-10 LAB — GLUCOSE BLDC GLUCOMTR-MCNC: 107

## 2023-03-10 PROCEDURE — 36415 COLL VENOUS BLD VENIPUNCTURE: CPT

## 2023-03-10 PROCEDURE — 99214 OFFICE O/P EST MOD 30 MIN: CPT | Mod: 25

## 2023-03-10 PROCEDURE — 82962 GLUCOSE BLOOD TEST: CPT

## 2023-03-10 RX ORDER — BLOOD SUGAR DIAGNOSTIC
32G X 6 MM STRIP MISCELLANEOUS
Refills: 0 | Status: DISCONTINUED | COMMUNITY
End: 2023-03-10

## 2023-03-10 RX ORDER — LANCETS 28 GAUGE
EACH MISCELLANEOUS
Qty: 300 | Refills: 3 | Status: DISCONTINUED | COMMUNITY
End: 2023-03-10

## 2023-03-10 RX ORDER — INSULIN ASPART 100 [IU]/ML
100 INJECTION, SOLUTION INTRAVENOUS; SUBCUTANEOUS
Qty: 2 | Refills: 5 | Status: DISCONTINUED | COMMUNITY
Start: 2021-12-16 | End: 2023-03-10

## 2023-03-10 NOTE — HISTORY OF PRESENT ILLNESS
[FreeTextEntry1] : F/u for diabetes management\par \par *** Mar 10, 2023 ***\par \par feels well. son is doing better (kidneys improved)\par taking Lantus 8 un HS,  Humalog ac B (3-3-4-4-5-6-7-8-9), ac L (0-9-9-8-9-10-11-12-13), ac D (2-3-4-8-4-8-8-9-10), metformin 500 mg bid,  Tradjenta 5 mg qd\par log: fbs- , ac L - 120's- 140's, ac D- \par \par *** Oct 07, 2022 ***\par \par doing well. diet is worse. son is feeling better- no more HD\par on  Lantus 8 un HS,  Admelog ac B (3-3-4-4-5-6-7-8-9), ac L (8-3-1-8-9-10-11-12-13), ac D (2-3-4-0-0-1-8-9-10),  metformin 1000 mg bid,  Tradjenta 5 mg qd,  Zocor 10 mg HS\par \par log: fbs-, ac L- , ac D- \par few random hypo's in 70's (mid day)\par \par *** Jun 03, 2022 ***\par \par feels fine. under stress - son had a kidney infection, requiring a temporary HD\par taking Lantus 7 un HS,  Admelog ac B (3-3-4-4-5-6-7-8-9), ac L (2-7-8-9-10-11-12-13-14), ac D (2-3-4-3-3-5-8-9-10), metformin 1000 mg bid,  Tradjenta 5 mg qd,  Zocor 10 mg HS\par \par log: fbs-112-174, ac D- \par \par *** Feb 09, 2022 ***\par \par feels great, no new c/o. diet is worse past few months\par taking  Lantus 7 un HS, Admelog ac B (3-3-4-4-5-6-7-8-9), ac L (2-5-1-9-10-11-12-13-14), ac D (2-3-4-5-3-7-8-9-10), metformin 500 mg bid,  Tradjenta 5 mg qd,  Zocor 10 mg HS\par reports fbs- , ac L- , ac D- \par \par *** Aug 17, 2021 ***\par \par on Lantus 6 to 8 un HS,  Admelog ac B (3-4-4-7-3-9-8-9-10), ac L (9-0-0-8-9-10-11-12-13), ac D (3-4-5-8-5-1-9-10-11), Tradjenta 5 mg, metformin 500 mg bid\par \par log: fbs- , ac L- , ac D- 101-223\par \par *** Apr 13, 2021 ***\par \par feels fine, no c/o\par on Lantus 10 un HS,  Admelog ac B (3-4-5-6-2-1-9-10-11),  ac L+D  (1-4-9-6-4-3-10-11-12), Tradjenta 5 mg, metformin 500 mg bid\par \par log: fbs- , ac L-  , ac D- 126-173 (few episodes of upper 200's- low 300's)\par with few subjective hypo's at night\par \par Thyr US (1/23/21)- normal\par \par HISTORY OF PRESENT ILLNESS. \par \par Ms. HERMAN was diagnosed with Diabetes Mellitus Type 2 for more than 30 years. \par She reports history HTN, dyslipidemia, and denies CAD,  known complications of retinopathy, nephropathy, or neuropathy. \par Previously under care of Drs. [facility], and as per records she used to take Levemir, Victoza, Prandin\par She is presently on Admelog 3 un tid, Metformin 500 mg bid, Tradjenta 5 mg qd, Zocor 10 mg\par She stopped checking her blood sugars at home. \par Fingerstick glucose in the office today is 484 mg/dL 4 hours after eating. \par Diet: not following ADA\par Exercise: walking\par \par Lab review: no recent labs\par \par Last dilated eye - 2020\par Last podiatry visit  - none\par Last cardiology evaluation -none\par Last stress test - none\par Last 2-D Echo - none\par Last nephrology evaluation - none\par Last neurology evaluation-none\par

## 2023-03-10 NOTE — ASSESSMENT
[Diabetes Foot Care] : diabetes foot care [Long Term Vascular Complications] : long term vascular complications of diabetes [Carbohydrate Consistent Diet] : carbohydrate consistent diet [Importance of Diet and Exercise] : importance of diet and exercise to improve glycemic control, achieve weight loss and improve cardiovascular health [Exercise/Effect on Glucose] : exercise/effect on glucose [Hypoglycemia Management] : hypoglycemia management [Glucagon Use] : glucagon use [Ketone Testing] : ketone testing [Action and use of Insulin] : action and use of short and long-acting insulin [Self Monitoring of Blood Glucose] : self monitoring of blood glucose [Insulin Self-Administration] : insulin self-administration [Injection Technique, Storage, Sharps Disposal] : injection technique, storage, and sharps disposal [Sick-Day Management] : sick-day management [Retinopathy Screening] : Patient was referred to ophthalmology for retinopathy screening [Diabetic Medications] : Risks and benefits of diabetic medications were discussed [FreeTextEntry1] : DM2, suboptimally controlled. Minimal pancreatic reserve\par - decr Lantus 7 un HS\par - Humalog ac B (3-3-4-4-5-6-7-8-9), ac L (5-8-6-8-9-10-11-12-13), ac D (2-3-4-6-0-2-8-9-10)\par - continue metformin 500 mg bid for now. Might uptitrate the dose eventually\par - Tradjenta 5 mg qd\par - we discussed CGMS and pumps, CeQur- she's reluctant at present (taking care of a son with autism, who might rip things off her body)\par - continue Zocor 10 mg HS\par - obtain prior DXA records (done in 2020)\par - glucose tabs, glucagon kit. \par RTC 3 months, to see CDE in between

## 2023-03-13 ENCOUNTER — NON-APPOINTMENT (OUTPATIENT)
Age: 68
End: 2023-03-13

## 2023-03-13 LAB
ALBUMIN SERPL ELPH-MCNC: 4.2 G/DL
ALP BLD-CCNC: 101 U/L
ALT SERPL-CCNC: 11 U/L
ANION GAP SERPL CALC-SCNC: 20 MMOL/L
AST SERPL-CCNC: 16 U/L
BILIRUB SERPL-MCNC: 0.2 MG/DL
BUN SERPL-MCNC: 35 MG/DL
C PEPTIDE SERPL-MCNC: 0.2 NG/ML
CALCIUM SERPL-MCNC: 10.2 MG/DL
CHLORIDE SERPL-SCNC: 102 MMOL/L
CHOLEST SERPL-MCNC: 142 MG/DL
CO2 SERPL-SCNC: 19 MMOL/L
CREAT SERPL-MCNC: 1.2 MG/DL
CREAT SPEC-SCNC: 148 MG/DL
EGFR: 50 ML/MIN/1.73M2
ESTIMATED AVERAGE GLUCOSE: 160 MG/DL
FOLATE SERPL-MCNC: 15.1 NG/ML
FRUCTOSAMINE SERPL-MCNC: 301 UMOL/L
GLUCOSE SERPL-MCNC: 87 MG/DL
HBA1C MFR BLD HPLC: 7.2 %
HDLC SERPL-MCNC: 73 MG/DL
LDLC SERPL CALC-MCNC: 53 MG/DL
MICROALBUMIN 24H UR DL<=1MG/L-MCNC: 2.6 MG/DL
MICROALBUMIN/CREAT 24H UR-RTO: 17 MG/G
NONHDLC SERPL-MCNC: 69 MG/DL
POTASSIUM SERPL-SCNC: 4.4 MMOL/L
PROT SERPL-MCNC: 7.1 G/DL
SODIUM SERPL-SCNC: 142 MMOL/L
T4 FREE SERPL-MCNC: 1.3 NG/DL
TRIGL SERPL-MCNC: 79 MG/DL
TSH SERPL-ACNC: 1.54 UIU/ML
VIT B12 SERPL-MCNC: 294 PG/ML

## 2023-03-27 ENCOUNTER — TRANSCRIPTION ENCOUNTER (OUTPATIENT)
Age: 68
End: 2023-03-27

## 2023-04-02 ENCOUNTER — TRANSCRIPTION ENCOUNTER (OUTPATIENT)
Age: 68
End: 2023-04-02

## 2023-04-02 RX ORDER — LANCETS 33 GAUGE
EACH MISCELLANEOUS
Qty: 3 | Refills: 3 | Status: ACTIVE | COMMUNITY
Start: 2021-02-22 | End: 1900-01-01

## 2023-04-06 ENCOUNTER — TRANSCRIPTION ENCOUNTER (OUTPATIENT)
Age: 68
End: 2023-04-06

## 2023-04-07 ENCOUNTER — TRANSCRIPTION ENCOUNTER (OUTPATIENT)
Age: 68
End: 2023-04-07

## 2023-04-10 ENCOUNTER — TRANSCRIPTION ENCOUNTER (OUTPATIENT)
Age: 68
End: 2023-04-10

## 2023-05-25 ENCOUNTER — TRANSCRIPTION ENCOUNTER (OUTPATIENT)
Age: 68
End: 2023-05-25

## 2023-06-21 ENCOUNTER — TRANSCRIPTION ENCOUNTER (OUTPATIENT)
Age: 68
End: 2023-06-21

## 2023-06-22 ENCOUNTER — TRANSCRIPTION ENCOUNTER (OUTPATIENT)
Age: 68
End: 2023-06-22

## 2023-06-23 ENCOUNTER — TRANSCRIPTION ENCOUNTER (OUTPATIENT)
Age: 68
End: 2023-06-23

## 2023-06-25 ENCOUNTER — TRANSCRIPTION ENCOUNTER (OUTPATIENT)
Age: 68
End: 2023-06-25

## 2023-06-26 ENCOUNTER — TRANSCRIPTION ENCOUNTER (OUTPATIENT)
Age: 68
End: 2023-06-26

## 2023-06-30 RX ORDER — LINAGLIPTIN 5 MG/1
5 TABLET, FILM COATED ORAL
Qty: 30 | Refills: 4 | Status: ACTIVE | COMMUNITY
Start: 2021-07-09 | End: 1900-01-01

## 2023-07-21 ENCOUNTER — TRANSCRIPTION ENCOUNTER (OUTPATIENT)
Age: 68
End: 2023-07-21

## 2023-07-28 ENCOUNTER — APPOINTMENT (OUTPATIENT)
Dept: ENDOCRINOLOGY | Facility: CLINIC | Age: 68
End: 2023-07-28
Payer: MEDICARE

## 2023-07-28 VITALS
RESPIRATION RATE: 16 BRPM | HEIGHT: 62 IN | HEART RATE: 74 BPM | BODY MASS INDEX: 25.58 KG/M2 | DIASTOLIC BLOOD PRESSURE: 78 MMHG | SYSTOLIC BLOOD PRESSURE: 122 MMHG | WEIGHT: 139 LBS | OXYGEN SATURATION: 99 % | TEMPERATURE: 97.6 F

## 2023-07-28 LAB — GLUCOSE BLDC GLUCOMTR-MCNC: 148

## 2023-07-28 PROCEDURE — 99214 OFFICE O/P EST MOD 30 MIN: CPT | Mod: 25

## 2023-07-28 PROCEDURE — 36415 COLL VENOUS BLD VENIPUNCTURE: CPT

## 2023-07-28 PROCEDURE — 82962 GLUCOSE BLOOD TEST: CPT

## 2023-07-28 RX ORDER — SITAGLIPTIN 100 MG/1
100 TABLET, FILM COATED ORAL DAILY
Qty: 1 | Refills: 2 | Status: ACTIVE | COMMUNITY
Start: 2023-07-28 | End: 1900-01-01

## 2023-07-28 NOTE — ASSESSMENT
[Diabetes Foot Care] : diabetes foot care [Long Term Vascular Complications] : long term vascular complications of diabetes [Carbohydrate Consistent Diet] : carbohydrate consistent diet [Importance of Diet and Exercise] : importance of diet and exercise to improve glycemic control, achieve weight loss and improve cardiovascular health [Exercise/Effect on Glucose] : exercise/effect on glucose [Hypoglycemia Management] : hypoglycemia management [Glucagon Use] : glucagon use [Ketone Testing] : ketone testing [Action and use of Insulin] : action and use of short and long-acting insulin [Self Monitoring of Blood Glucose] : self monitoring of blood glucose [Insulin Self-Administration] : insulin self-administration [Injection Technique, Storage, Sharps Disposal] : injection technique, storage, and sharps disposal [Sick-Day Management] : sick-day management [Retinopathy Screening] : Patient was referred to ophthalmology for retinopathy screening [Diabetic Medications] : Risks and benefits of diabetic medications were discussed [FreeTextEntry1] : DM2, suboptimally controlled. Minimal pancreatic reserve\par - increase Lantus 8 un HS\par - Humalog ac B (3-3-4-4-5-6-7-8-9), ac L (5-5-8-5-2-4-10-11-12), ac D (2-3-4-2-3-1-8-9-10)\par - continue metformin 500 mg bid for now. Might uptitrate the dose eventually\par - Tradjenta is expensive- will switch to Januvia 100 mg qd\par - we discussed CGMS and pumps, CeQur- she's reluctant at present (taking care of a son with autism, who might rip things off her body)\par - continue Zocor 10 mg HS\par - obtain prior DXA records (done in 2020)\par - glucose tabs, glucagon kit. \par RTC 3 months, to see CDE in between

## 2023-07-28 NOTE — HISTORY OF PRESENT ILLNESS
[FreeTextEntry1] : F/u for diabetes management\par \par *** Jul 28, 2023 ***\par \par feels well. diet is worse (vacation), less exercising\par taking  Lantus 7 un HS,  Humalog ac B (3-3-4-4-5-6-7-8-9), ac L (1-1-5-8-9-10-11-12-13), ac D (2-3-4-1-0-3-8-9-10), metformin 500 mg bid , Tradjenta 5 mg qd, Zocor 10 mg HS\par log: fbs- 111-173, ac L- , ac D- \par \par *** Mar 10, 2023 ***\par \par feels well. son is doing better (kidneys improved)\par taking Lantus 8 un HS,  Humalog ac B (3-3-4-4-5-6-7-8-9), ac L (7-8-5-8-9-10-11-12-13), ac D (2-3-4-8-5-8-8-9-10), metformin 500 mg bid,  Tradjenta 5 mg qd\par log: fbs- , ac L - 120's- 140's, ac D- \par \par *** Oct 07, 2022 ***\par \par doing well. diet is worse. son is feeling better- no more HD\par on  Lantus 8 un HS,  Admelog ac B (3-3-4-4-5-6-7-8-9), ac L (9-2-5-8-9-10-11-12-13), ac D (2-3-4-2-5-0-8-9-10),  metformin 1000 mg bid,  Tradjenta 5 mg qd,  Zocor 10 mg HS\par \par log: fbs-, ac L- , ac D- \par few random hypo's in 70's (mid day)\par \par *** Jun 03, 2022 ***\par \par feels fine. under stress - son had a kidney infection, requiring a temporary HD\par taking Lantus 7 un HS,  Admelog ac B (3-3-4-4-5-6-7-8-9), ac L (5-6-7-9-10-11-12-13-14), ac D (2-3-4-8-9-7-8-9-10), metformin 1000 mg bid,  Tradjenta 5 mg qd,  Zocor 10 mg HS\par \par log: fbs-112-174, ac D- \par \par *** Feb 09, 2022 ***\par \par feels great, no new c/o. diet is worse past few months\par taking  Lantus 7 un HS, Admelog ac B (3-3-4-4-5-6-7-8-9), ac L (2-5-8-9-10-11-12-13-14), ac D (2-3-4-8-6-2-8-9-10), metformin 500 mg bid,  Tradjenta 5 mg qd,  Zocor 10 mg HS\par reports fbs- , ac L- , ac D- \par \par *** Aug 17, 2021 ***\par \par on Lantus 6 to 8 un HS,  Admelog ac B (3-4-4-2-4-6-8-9-10), ac L (8-2-6-8-9-10-11-12-13), ac D (3-4-5-8-3-0-9-10-11), Tradjenta 5 mg, metformin 500 mg bid\par \par log: fbs- , ac L- , ac D- 101-223\par \par *** Apr 13, 2021 ***\par \par feels fine, no c/o\par on Lantus 10 un HS,  Admelog ac B (3-4-5-4-1-6-9-10-11),  ac L+D  (8-3-3-1-5-3-10-11-12), Tradjenta 5 mg, metformin 500 mg bid\par \par log: fbs- , ac L-  , ac D- 126-173 (few episodes of upper 200's- low 300's)\par with few subjective hypo's at night\par \par Thyr US (1/23/21)- normal\par \par HISTORY OF PRESENT ILLNESS. \par \par Ms. HERMAN was diagnosed with Diabetes Mellitus Type 2 for more than 30 years. \par She reports history HTN, dyslipidemia, and denies CAD,  known complications of retinopathy, nephropathy, or neuropathy. \par Previously under [facility] of [facility], and as per records she used to take Levemir, Victoza, Prandin\par She is presently on Admelog 3 un tid, Metformin 500 mg bid, Tradjenta 5 mg qd, Zocor 10 mg\par She stopped checking her blood sugars at home. \par Fingerstick glucose in the office today is 484 mg/dL 4 hours after eating. \par Diet: not following ADA\par Exercise: walking\par \par Lab review: no recent labs\par \par Last dilated eye - 2020\par Last podiatry visit  - none\par Last cardiology evaluation -none\par Last stress test - none\par Last 2-D Echo - none\par Last nephrology evaluation - none\par Last neurology evaluation-none\par

## 2023-07-31 ENCOUNTER — TRANSCRIPTION ENCOUNTER (OUTPATIENT)
Age: 68
End: 2023-07-31

## 2023-08-01 ENCOUNTER — TRANSCRIPTION ENCOUNTER (OUTPATIENT)
Age: 68
End: 2023-08-01

## 2023-08-01 LAB
ALBUMIN SERPL ELPH-MCNC: 4.1 G/DL
ALP BLD-CCNC: 95 U/L
ALT SERPL-CCNC: 10 U/L
ANION GAP SERPL CALC-SCNC: 13 MMOL/L
AST SERPL-CCNC: 16 U/L
BILIRUB SERPL-MCNC: 0.2 MG/DL
BUN SERPL-MCNC: 29 MG/DL
CALCIUM SERPL-MCNC: 9.2 MG/DL
CHLORIDE SERPL-SCNC: 101 MMOL/L
CHOLEST SERPL-MCNC: 138 MG/DL
CO2 SERPL-SCNC: 26 MMOL/L
CREAT SERPL-MCNC: 1.28 MG/DL
EGFR: 46 ML/MIN/1.73M2
ESTIMATED AVERAGE GLUCOSE: 154 MG/DL
FRUCTOSAMINE SERPL-MCNC: 284 UMOL/L
GLUCOSE SERPL-MCNC: 129 MG/DL
HBA1C MFR BLD HPLC: 7 %
HDLC SERPL-MCNC: 64 MG/DL
LDLC SERPL CALC-MCNC: 58 MG/DL
NONHDLC SERPL-MCNC: 74 MG/DL
POTASSIUM SERPL-SCNC: 4.4 MMOL/L
PROT SERPL-MCNC: 6.7 G/DL
SODIUM SERPL-SCNC: 141 MMOL/L
TRIGL SERPL-MCNC: 81 MG/DL
TSH SERPL-ACNC: 1.32 UIU/ML

## 2023-08-02 ENCOUNTER — TRANSCRIPTION ENCOUNTER (OUTPATIENT)
Age: 68
End: 2023-08-02

## 2023-09-19 ENCOUNTER — TRANSCRIPTION ENCOUNTER (OUTPATIENT)
Age: 68
End: 2023-09-19

## 2023-09-19 RX ORDER — LANCETS 33 GAUGE
EACH MISCELLANEOUS
Qty: 3 | Refills: 3 | Status: ACTIVE | COMMUNITY
Start: 2021-02-10 | End: 1900-01-01

## 2023-09-20 ENCOUNTER — TRANSCRIPTION ENCOUNTER (OUTPATIENT)
Age: 68
End: 2023-09-20

## 2023-09-26 ENCOUNTER — TRANSCRIPTION ENCOUNTER (OUTPATIENT)
Age: 68
End: 2023-09-26

## 2023-11-03 ENCOUNTER — TRANSCRIPTION ENCOUNTER (OUTPATIENT)
Age: 68
End: 2023-11-03

## 2023-11-03 RX ORDER — INSULIN LISPRO 100 [IU]/ML
100 INJECTION, SOLUTION INTRAVENOUS; SUBCUTANEOUS
Qty: 3 | Refills: 2 | Status: ACTIVE | COMMUNITY
Start: 2022-01-01 | End: 1900-01-01

## 2023-12-01 ENCOUNTER — APPOINTMENT (OUTPATIENT)
Dept: ENDOCRINOLOGY | Facility: CLINIC | Age: 68
End: 2023-12-01
Payer: MEDICARE

## 2023-12-01 VITALS
BODY MASS INDEX: 26.31 KG/M2 | HEIGHT: 62 IN | WEIGHT: 143 LBS | SYSTOLIC BLOOD PRESSURE: 150 MMHG | RESPIRATION RATE: 16 BRPM | TEMPERATURE: 98 F | HEART RATE: 94 BPM | DIASTOLIC BLOOD PRESSURE: 83 MMHG | OXYGEN SATURATION: 99 %

## 2023-12-01 LAB — HBA1C MFR BLD HPLC: 7.1

## 2023-12-01 PROCEDURE — 36415 COLL VENOUS BLD VENIPUNCTURE: CPT

## 2023-12-01 PROCEDURE — 99214 OFFICE O/P EST MOD 30 MIN: CPT | Mod: 25

## 2023-12-01 PROCEDURE — 83036 HEMOGLOBIN GLYCOSYLATED A1C: CPT | Mod: QW

## 2023-12-02 ENCOUNTER — TRANSCRIPTION ENCOUNTER (OUTPATIENT)
Age: 68
End: 2023-12-02

## 2023-12-04 ENCOUNTER — TRANSCRIPTION ENCOUNTER (OUTPATIENT)
Age: 68
End: 2023-12-04

## 2023-12-04 LAB
25(OH)D3 SERPL-MCNC: 41.4 NG/ML
ALBUMIN SERPL ELPH-MCNC: 4.3 G/DL
ALP BLD-CCNC: 98 U/L
ALT SERPL-CCNC: 14 U/L
ANION GAP SERPL CALC-SCNC: 14 MMOL/L
AST SERPL-CCNC: 20 U/L
BASOPHILS # BLD AUTO: 0.08 K/UL
BASOPHILS NFR BLD AUTO: 0.8 %
BILIRUB SERPL-MCNC: 0.2 MG/DL
BUN SERPL-MCNC: 35 MG/DL
CALCIUM SERPL-MCNC: 9.4 MG/DL
CHLORIDE SERPL-SCNC: 102 MMOL/L
CHOLEST SERPL-MCNC: 148 MG/DL
CO2 SERPL-SCNC: 22 MMOL/L
CREAT SERPL-MCNC: 1.18 MG/DL
CREAT SPEC-SCNC: 75 MG/DL
EGFR: 51 ML/MIN/1.73M2
EOSINOPHIL # BLD AUTO: 0.08 K/UL
EOSINOPHIL NFR BLD AUTO: 0.8 %
ESTIMATED AVERAGE GLUCOSE: 157 MG/DL
FOLATE SERPL-MCNC: 15.8 NG/ML
FRUCTOSAMINE SERPL-MCNC: 286 UMOL/L
GLUCOSE SERPL-MCNC: 105 MG/DL
HBA1C MFR BLD HPLC: 7.1 %
HCT VFR BLD CALC: 36 %
HDLC SERPL-MCNC: 77 MG/DL
HGB BLD-MCNC: 11 G/DL
IMM GRANULOCYTES NFR BLD AUTO: 0.3 %
LDLC SERPL CALC-MCNC: 56 MG/DL
LYMPHOCYTES # BLD AUTO: 2.01 K/UL
LYMPHOCYTES NFR BLD AUTO: 20.7 %
MAN DIFF?: NORMAL
MCHC RBC-ENTMCNC: 27.1 PG
MCHC RBC-ENTMCNC: 30.6 GM/DL
MCV RBC AUTO: 88.7 FL
MICROALBUMIN 24H UR DL<=1MG/L-MCNC: <1.2 MG/DL
MICROALBUMIN/CREAT 24H UR-RTO: NORMAL MG/G
MONOCYTES # BLD AUTO: 0.77 K/UL
MONOCYTES NFR BLD AUTO: 7.9 %
NEUTROPHILS # BLD AUTO: 6.74 K/UL
NEUTROPHILS NFR BLD AUTO: 69.5 %
NONHDLC SERPL-MCNC: 70 MG/DL
PLATELET # BLD AUTO: 295 K/UL
POTASSIUM SERPL-SCNC: 4.8 MMOL/L
PROT SERPL-MCNC: 7 G/DL
RBC # BLD: 4.06 M/UL
RBC # FLD: 14.8 %
SODIUM SERPL-SCNC: 137 MMOL/L
T4 FREE SERPL-MCNC: 1.2 NG/DL
TRIGL SERPL-MCNC: 73 MG/DL
TSH SERPL-ACNC: 1.43 UIU/ML
VIT B12 SERPL-MCNC: 268 PG/ML
WBC # FLD AUTO: 9.71 K/UL

## 2023-12-04 RX ORDER — LANCETS 28 GAUGE
EACH MISCELLANEOUS
Qty: 1 | Refills: 0 | Status: ACTIVE | COMMUNITY
Start: 2023-12-04 | End: 1900-01-01

## 2023-12-11 ENCOUNTER — TRANSCRIPTION ENCOUNTER (OUTPATIENT)
Age: 68
End: 2023-12-11

## 2023-12-11 RX ORDER — BLOOD SUGAR DIAGNOSTIC
STRIP MISCELLANEOUS DAILY
Qty: 1 | Refills: 1 | Status: ACTIVE | COMMUNITY
Start: 2023-12-11 | End: 1900-01-01

## 2023-12-14 ENCOUNTER — TRANSCRIPTION ENCOUNTER (OUTPATIENT)
Age: 68
End: 2023-12-14

## 2024-01-08 ENCOUNTER — TRANSCRIPTION ENCOUNTER (OUTPATIENT)
Age: 69
End: 2024-01-08

## 2024-01-09 ENCOUNTER — TRANSCRIPTION ENCOUNTER (OUTPATIENT)
Age: 69
End: 2024-01-09

## 2024-01-17 ENCOUNTER — TRANSCRIPTION ENCOUNTER (OUTPATIENT)
Age: 69
End: 2024-01-17

## 2024-01-17 RX ORDER — ALOGLIPTIN 25 MG/1
25 TABLET, FILM COATED ORAL DAILY
Qty: 90 | Refills: 0 | Status: ACTIVE | COMMUNITY
Start: 2023-08-01 | End: 1900-01-01

## 2024-02-05 ENCOUNTER — RX CHANGE (OUTPATIENT)
Age: 69
End: 2024-02-05

## 2024-02-20 ENCOUNTER — TRANSCRIPTION ENCOUNTER (OUTPATIENT)
Age: 69
End: 2024-02-20

## 2024-02-20 RX ORDER — PEN NEEDLE, DIABETIC 29 G X1/2"
32G X 4 MM NEEDLE, DISPOSABLE MISCELLANEOUS
Qty: 360 | Refills: 3 | Status: ACTIVE | COMMUNITY
Start: 2021-01-06 | End: 1900-01-01

## 2024-02-21 ENCOUNTER — TRANSCRIPTION ENCOUNTER (OUTPATIENT)
Age: 69
End: 2024-02-21

## 2024-02-21 RX ORDER — INSULIN GLARGINE 100 [IU]/ML
100 INJECTION, SOLUTION SUBCUTANEOUS
Qty: 3 | Refills: 3 | Status: ACTIVE | COMMUNITY
Start: 2021-01-12 | End: 1900-01-01

## 2024-03-05 ENCOUNTER — TRANSCRIPTION ENCOUNTER (OUTPATIENT)
Age: 69
End: 2024-03-05

## 2024-04-12 ENCOUNTER — APPOINTMENT (OUTPATIENT)
Dept: ENDOCRINOLOGY | Facility: CLINIC | Age: 69
End: 2024-04-12
Payer: MEDICARE

## 2024-04-12 VITALS
SYSTOLIC BLOOD PRESSURE: 156 MMHG | DIASTOLIC BLOOD PRESSURE: 85 MMHG | HEIGHT: 62 IN | HEART RATE: 95 BPM | WEIGHT: 147 LBS | OXYGEN SATURATION: 95 % | BODY MASS INDEX: 27.05 KG/M2

## 2024-04-12 DIAGNOSIS — E11.65 TYPE 2 DIABETES MELLITUS WITH HYPERGLYCEMIA: ICD-10-CM

## 2024-04-12 DIAGNOSIS — E55.9 VITAMIN D DEFICIENCY, UNSPECIFIED: ICD-10-CM

## 2024-04-12 DIAGNOSIS — E78.5 HYPERLIPIDEMIA, UNSPECIFIED: ICD-10-CM

## 2024-04-12 DIAGNOSIS — Z13.820 ENCOUNTER FOR SCREENING FOR OSTEOPOROSIS: ICD-10-CM

## 2024-04-12 PROCEDURE — G2211 COMPLEX E/M VISIT ADD ON: CPT

## 2024-04-12 PROCEDURE — 99214 OFFICE O/P EST MOD 30 MIN: CPT

## 2024-04-12 PROCEDURE — 36415 COLL VENOUS BLD VENIPUNCTURE: CPT

## 2024-04-12 NOTE — HISTORY OF PRESENT ILLNESS
[FreeTextEntry1] : F/u for diabetes management  *** Apr 12, 2024 ***  under stress with her son. diet is worse. gained more weight taking  Lantus 7 un HS,  Humalog ac B (3-3-4-4-5-6-7-8-9), ac L (4-1-1-0-0-3-10-11-12), ac D (2-3-4-0-4-4-8-9-10), metformin 500 mg bid ,  Alogliptin 25 mg qd was not able to obtain recent DXA report  log: fbs- 116-136, ppg-   *** Dec 01, 2023 ***  doing well, no new c/o. diet has been a bit off, gained 4 lbs taking  Lantus 7 un HS,  Humalog ac B (3-3-4-4-5-6-7-8-9), ac L (3-5-5-0-7-1-10-11-12), ac D (2-3-4-9-2-3-8-9-10), metformin 500 mg bid ,  Alogliptin 25 mg qd, Zocor 10 mg HS log: fbs- , ac L- 110's-150's, ac D-    *** Jul 28, 2023 ***  feels well. diet is worse (vacation), less exercising taking  Lantus 7 un HS,  Humalog ac B (3-3-4-4-5-6-7-8-9), ac L (6-5-7-8-9-10-11-12-13), ac D (2-3-4-3-2-9-8-9-10), metformin 500 mg bid , Tradjenta 5 mg qd, Zocor 10 mg HS log: fbs- 111-173, ac L- , ac D-   *** Mar 10, 2023 ***  feels well. son is doing better (kidneys improved) taking Lantus 8 un HS,  Humalog ac B (3-3-4-4-5-6-7-8-9), ac L (1-3-4-8-9-10-11-12-13), ac D (2-3-4-6-0-1-8-9-10), metformin 500 mg bid,  Tradjenta 5 mg qd log: fbs- , ac L - 120's- 140's, ac D-   *** Oct 07, 2022 ***  doing well. diet is worse. son is feeling better- no more HD on  Lantus 8 un HS,  Admelog ac B (3-3-4-4-5-6-7-8-9), ac L (1-9-8-8-9-10-11-12-13), ac D (2-3-4-0-5-6-8-9-10),  metformin 1000 mg bid,  Tradjenta 5 mg qd,  Zocor 10 mg HS  log: fbs-, ac L- , ac D-  few random hypo's in 70's (mid day)  *** Jun 03, 2022 ***  feels fine. under stress - son had a kidney infection, requiring a temporary HD taking Lantus 7 un HS,  Admelog ac B (3-3-4-4-5-6-7-8-9), ac L (3-7-8-9-10-11-12-13-14), ac D (2-3-4-1-7-4-8-9-10), metformin 1000 mg bid,  Tradjenta 5 mg qd,  Zocor 10 mg HS  log: fbs-112-174, ac D-   *** Feb 09, 2022 ***  feels great, no new c/o. diet is worse past few months taking  Lantus 7 un HS, Admelog ac B (3-3-4-4-5-6-7-8-9), ac L (1-2-3-9-10-11-12-13-14), ac D (2-3-4-1-1-4-8-9-10), metformin 500 mg bid,  Tradjenta 5 mg qd,  Zocor 10 mg HS reports fbs- , ac L- , ac D-   *** Aug 17, 2021 ***  on Lantus 6 to 8 un HS,  Admelog ac B (3-4-4-8-3-6-8-9-10), ac L (1-0-8-8-9-10-11-12-13), ac D (3-4-5-1-0-5-9-10-11), Tradjenta 5 mg, metformin 500 mg bid  log: fbs- , ac L- , ac D- 101-223  *** Apr 13, 2021 ***  feels fine, no c/o on Lantus 10 un HS,  Admelog ac B (3-4-5-8-4-2-9-10-11),  ac L+D  (0-1-0-9-8-3-10-11-12), Tradjenta 5 mg, metformin 500 mg bid  log: fbs- , ac L-  , ac D- 126-173 (few episodes of upper 200's- low 300's) with few subjective hypo's at night  Thyr US (1/23/21)- normal  HISTORY OF PRESENT ILLNESS.   Ms. HERMAN was diagnosed with Diabetes Mellitus Type 2 for more than 30 years.  She reports history HTN, dyslipidemia, and denies CAD,  known complications of retinopathy, nephropathy, or neuropathy.  Previously under [facility], and as per records she used to take Levemir, Victoza, Prandin She is presently on Admelog 3 un tid, Metformin 500 mg bid, Tradjenta 5 mg qd, Zocor 10 mg She stopped checking her blood sugars at home.  Fingerstick glucose in the office today is 484 mg/dL 4 hours after eating.  Diet: not following ADA Exercise: walking  Lab review: no recent labs  Last dilated eye - 2023 Last podiatry visit  - none Last cardiology evaluation -none Last stress test - none Last 2-D Echo - none Last nephrology evaluation - none Last neurology evaluation-none

## 2024-04-12 NOTE — ASSESSMENT
[Diabetes Foot Care] : diabetes foot care [Long Term Vascular Complications] : long term vascular complications of diabetes [Carbohydrate Consistent Diet] : carbohydrate consistent diet [Importance of Diet and Exercise] : importance of diet and exercise to improve glycemic control, achieve weight loss and improve cardiovascular health [Exercise/Effect on Glucose] : exercise/effect on glucose [Hypoglycemia Management] : hypoglycemia management [Glucagon Use] : glucagon use [Ketone Testing] : ketone testing [Action and use of Insulin] : action and use of short and long-acting insulin [Self Monitoring of Blood Glucose] : self monitoring of blood glucose [Insulin Self-Administration] : insulin self-administration [Injection Technique, Storage, Sharps Disposal] : injection technique, storage, and sharps disposal [Sick-Day Management] : sick-day management [Retinopathy Screening] : Patient was referred to ophthalmology for retinopathy screening [Diabetic Medications] : Risks and benefits of diabetic medications were discussed [FreeTextEntry1] : DM2, suboptimally controlled. Minimal pancreatic reserve - Lantus 7 un HS - Humalog ac B (3-3-4-4-5-6-7-8-9), ac L (8-0-8-5-1-3-10-11-12), ac D (2-3-4-7-3-6-8-9-10) - continue metformin 500 mg bid for now. Might uptitrate the dose eventually - Alogliptin 25 mg qd - previously with nausea on Victoza- we discussed trying ozempic. she wants to wait for now - we discussed CGMS and pumps, CeQur- she's reluctant at present (taking care of a son with autism, who might rip things off her body) - continue Zocor 10 mg HS - obtain prior DXA records (done in 2020). will refer for another one. - glucose tabs, glucagon kit. RTC 3 months, to see CDE in between.

## 2024-04-17 ENCOUNTER — TRANSCRIPTION ENCOUNTER (OUTPATIENT)
Age: 69
End: 2024-04-17

## 2024-04-17 ENCOUNTER — NON-APPOINTMENT (OUTPATIENT)
Age: 69
End: 2024-04-17

## 2024-04-17 LAB
ESTIMATED AVERAGE GLUCOSE: 177 MG/DL
HBA1C MFR BLD HPLC: 7.8 %

## 2024-04-18 ENCOUNTER — TRANSCRIPTION ENCOUNTER (OUTPATIENT)
Age: 69
End: 2024-04-18

## 2024-04-18 ENCOUNTER — NON-APPOINTMENT (OUTPATIENT)
Age: 69
End: 2024-04-18

## 2024-04-18 LAB
25(OH)D3 SERPL-MCNC: 39.6 NG/ML
ALBUMIN SERPL ELPH-MCNC: 4.2 G/DL
ALP BLD-CCNC: 102 U/L
ALT SERPL-CCNC: 11 U/L
ANION GAP SERPL CALC-SCNC: 14 MMOL/L
AST SERPL-CCNC: 13 U/L
BILIRUB SERPL-MCNC: 0.2 MG/DL
BUN SERPL-MCNC: 33 MG/DL
CALCIUM SERPL-MCNC: 9.5 MG/DL
CHLORIDE SERPL-SCNC: 102 MMOL/L
CHOLEST SERPL-MCNC: 159 MG/DL
CO2 SERPL-SCNC: 25 MMOL/L
CREAT SERPL-MCNC: 1.27 MG/DL
CREAT SPEC-SCNC: 96 MG/DL
EGFR: 47 ML/MIN/1.73M2
FOLATE SERPL-MCNC: 19.2 NG/ML
FRUCTOSAMINE SERPL-MCNC: 318 UMOL/L
GLUCOSE SERPL-MCNC: 115 MG/DL
HDLC SERPL-MCNC: 74 MG/DL
LDLC SERPL CALC-MCNC: 71 MG/DL
MICROALBUMIN 24H UR DL<=1MG/L-MCNC: <1.2 MG/DL
MICROALBUMIN/CREAT 24H UR-RTO: NORMAL MG/G
NONHDLC SERPL-MCNC: 86 MG/DL
POTASSIUM SERPL-SCNC: 4.7 MMOL/L
PROT SERPL-MCNC: 6.7 G/DL
SODIUM SERPL-SCNC: 140 MMOL/L
T4 FREE SERPL-MCNC: 1.3 NG/DL
TRIGL SERPL-MCNC: 79 MG/DL
TSH SERPL-ACNC: 1.78 UIU/ML
VIT B12 SERPL-MCNC: 292 PG/ML

## 2024-05-19 ENCOUNTER — TRANSCRIPTION ENCOUNTER (OUTPATIENT)
Age: 69
End: 2024-05-19

## 2024-06-11 ENCOUNTER — TRANSCRIPTION ENCOUNTER (OUTPATIENT)
Age: 69
End: 2024-06-11

## 2024-06-11 RX ORDER — ALOGLIPTIN 25 MG/1
25 TABLET, FILM COATED ORAL
Qty: 90 | Refills: 2 | Status: ACTIVE | COMMUNITY
Start: 2024-01-16 | End: 1900-01-01

## 2024-06-12 RX ORDER — BLOOD SUGAR DIAGNOSTIC
STRIP MISCELLANEOUS
Qty: 400 | Refills: 4 | Status: ACTIVE | COMMUNITY
Start: 1900-01-01 | End: 1900-01-01

## 2024-07-08 ENCOUNTER — TRANSCRIPTION ENCOUNTER (OUTPATIENT)
Age: 69
End: 2024-07-08

## 2024-07-15 ENCOUNTER — TRANSCRIPTION ENCOUNTER (OUTPATIENT)
Age: 69
End: 2024-07-15

## 2024-07-18 ENCOUNTER — TRANSCRIPTION ENCOUNTER (OUTPATIENT)
Age: 69
End: 2024-07-18

## 2024-08-02 ENCOUNTER — TRANSCRIPTION ENCOUNTER (OUTPATIENT)
Age: 69
End: 2024-08-02

## 2024-08-12 ENCOUNTER — TRANSCRIPTION ENCOUNTER (OUTPATIENT)
Age: 69
End: 2024-08-12

## 2024-08-27 ENCOUNTER — TRANSCRIPTION ENCOUNTER (OUTPATIENT)
Age: 69
End: 2024-08-27

## 2024-09-03 ENCOUNTER — TRANSCRIPTION ENCOUNTER (OUTPATIENT)
Age: 69
End: 2024-09-03

## 2024-09-27 ENCOUNTER — APPOINTMENT (OUTPATIENT)
Dept: ENDOCRINOLOGY | Facility: CLINIC | Age: 69
End: 2024-09-27
Payer: MEDICARE

## 2024-09-27 VITALS
HEART RATE: 105 BPM | SYSTOLIC BLOOD PRESSURE: 135 MMHG | DIASTOLIC BLOOD PRESSURE: 85 MMHG | OXYGEN SATURATION: 99 % | BODY MASS INDEX: 27.05 KG/M2 | HEIGHT: 62 IN | WEIGHT: 147 LBS

## 2024-09-27 DIAGNOSIS — E78.5 HYPERLIPIDEMIA, UNSPECIFIED: ICD-10-CM

## 2024-09-27 DIAGNOSIS — E11.65 TYPE 2 DIABETES MELLITUS WITH HYPERGLYCEMIA: ICD-10-CM

## 2024-09-27 DIAGNOSIS — Z13.820 ENCOUNTER FOR SCREENING FOR OSTEOPOROSIS: ICD-10-CM

## 2024-09-27 LAB — HBA1C MFR BLD HPLC: 7.3

## 2024-09-27 PROCEDURE — 99214 OFFICE O/P EST MOD 30 MIN: CPT

## 2024-09-27 PROCEDURE — 83036 HEMOGLOBIN GLYCOSYLATED A1C: CPT | Mod: QW

## 2024-09-27 PROCEDURE — 36415 COLL VENOUS BLD VENIPUNCTURE: CPT

## 2024-09-27 NOTE — HISTORY OF PRESENT ILLNESS
[FreeTextEntry1] : F/u for diabetes management  *** Sep 27, 2024 ***  under stress-  is diagnosed with liver cancer, undergoing chemo staying on  Lantus 7 un HS,  Humalog ac B (3-3-4-4-5-6-7-8-9), ac L (3-1-5-5-1-8-10-11-12), ac D (2-3-4-4-5-3-8-9-10), metformin 1000 mg bid,  Alogliptin 25 mg qd log: fbs- 111-174, ac L- , ac D- 103-175 POC a1c- 7.3%  *** Apr 12, 2024 ***  under stress with her son. diet is worse. gained more weight taking  Lantus 7 un HS,  Humalog ac B (3-3-4-4-5-6-7-8-9), ac L (2-6-8-9-7-2-10-11-12), ac D (2-3-4-1-3-6-8-9-10), metformin 500 mg bid ,  Alogliptin 25 mg qd was not able to obtain recent DXA report  log: fbs- 116-136, ppg-   *** Dec 01, 2023 ***  doing well, no new c/o. diet has been a bit off, gained 4 lbs taking  Lantus 7 un HS,  Humalog ac B (3-3-4-4-5-6-7-8-9), ac L (0-4-6-8-6-5-10-11-12), ac D (2-3-4-1-5-2-8-9-10), metformin 500 mg bid ,  Alogliptin 25 mg qd, Zocor 10 mg HS log: fbs- , ac L- 110's-150's, ac D-    *** Jul 28, 2023 ***  feels well. diet is worse (vacation), less exercising taking  Lantus 7 un HS,  Humalog ac B (3-3-4-4-5-6-7-8-9), ac L (8-0-4-8-9-10-11-12-13), ac D (2-3-4-2-3-0-8-9-10), metformin 500 mg bid , Tradjenta 5 mg qd, Zocor 10 mg HS log: fbs- 111-173, ac L- , ac D-   *** Mar 10, 2023 ***  feels well. son is doing better (kidneys improved) taking Lantus 8 un HS,  Humalog ac B (3-3-4-4-5-6-7-8-9), ac L (7-4-5-8-9-10-11-12-13), ac D (2-3-4-3-7-0-8-9-10), metformin 500 mg bid,  Tradjenta 5 mg qd log: fbs- , ac L - 120's- 140's, ac D-   *** Oct 07, 2022 ***  doing well. diet is worse. son is feeling better- no more HD on  Lantus 8 un HS,  Admelog ac B (3-3-4-4-5-6-7-8-9), ac L (3-9-4-8-9-10-11-12-13), ac D (2-3-4-3-9-7-8-9-10),  metformin 1000 mg bid,  Tradjenta 5 mg qd,  Zocor 10 mg HS  log: fbs-, ac L- , ac D-  few random hypo's in 70's (mid day)  *** Jun 03, 2022 ***  feels fine. under stress - son had a kidney infection, requiring a temporary HD taking Lantus 7 un HS,  Admelog ac B (3-3-4-4-5-6-7-8-9), ac L (6-8-1-9-10-11-12-13-14), ac D (2-3-4-6-0-7-8-9-10), metformin 1000 mg bid,  Tradjenta 5 mg qd,  Zocor 10 mg HS  log: fbs-112-174, ac D-   *** Feb 09, 2022 ***  feels great, no new c/o. diet is worse past few months taking  Lantus 7 un HS, Admelog ac B (3-3-4-4-5-6-7-8-9), ac L (2-4-9-9-10-11-12-13-14), ac D (2-3-4-7-0-3-8-9-10), metformin 500 mg bid,  Tradjenta 5 mg qd,  Zocor 10 mg HS reports fbs- , ac L- , ac D-   *** Aug 17, 2021 ***  on Lantus 6 to 8 un HS,  Admelog ac B (3-4-4-2-1-6-8-9-10), ac L (5-1-2-8-9-10-11-12-13), ac D (3-4-5-9-9-0-9-10-11), Tradjenta 5 mg, metformin 500 mg bid  log: fbs- , ac L- , ac D- 101-223  *** Apr 13, 2021 ***  feels fine, no c/o on Lantus 10 un HS,  Admelog ac B (3-4-5-4-5-6-9-10-11),  ac L+D  (6-2-5-5-4-9-10-11-12), Tradjenta 5 mg, metformin 500 mg bid  log: fbs- , ac L-  , ac D- 126-173 (few episodes of upper 200's- low 300's) with few subjective hypo's at night  Thyr  (1/23/21)- normal  HISTORY OF PRESENT ILLNESS.   Ms. HERMAN was diagnosed with Diabetes Mellitus Type 2 for more than 30 years.  She reports history HTN, dyslipidemia, and denies CAD,  known complications of retinopathy, nephropathy, or neuropathy.  [facility] under [facility], and as per records she used to take Levemir, Victoza, Prandin She is presently on Admelog 3 un tid, Metformin 500 mg bid, Tradjenta 5 mg qd, Zocor 10 mg She stopped checking her blood sugars at home.  Fingerstick glucose in the office today is 484 mg/dL 4 hours after eating.  Diet: not following ADA Exercise: walking  Lab review: no recent labs  Last dilated eye - 2023 Last podiatry visit  - none Last cardiology evaluation -none Last stress test - none Last 2-D Echo - none Last nephrology evaluation - none Last neurology evaluation-none

## 2024-09-27 NOTE — ASSESSMENT
[Diabetes Foot Care] : diabetes foot care [Long Term Vascular Complications] : long term vascular complications of diabetes [Carbohydrate Consistent Diet] : carbohydrate consistent diet [Importance of Diet and Exercise] : importance of diet and exercise to improve glycemic control, achieve weight loss and improve cardiovascular health [Exercise/Effect on Glucose] : exercise/effect on glucose [Hypoglycemia Management] : hypoglycemia management [Glucagon Use] : glucagon use [Ketone Testing] : ketone testing [Action and use of Insulin] : action and use of short and long-acting insulin [Self Monitoring of Blood Glucose] : self monitoring of blood glucose [Insulin Self-Administration] : insulin self-administration [Injection Technique, Storage, Sharps Disposal] : injection technique, storage, and sharps disposal [Sick-Day Management] : sick-day management [Retinopathy Screening] : Patient was referred to ophthalmology for retinopathy screening [Diabetic Medications] : Risks and benefits of diabetic medications were discussed [FreeTextEntry1] : DM2, suboptimally controlled. Minimal pancreatic reserve - Lantus 7 un HS - Humalog ac B (3-3-4-4-5-6-7-8-9), ac L (1-0-4-9-5-7-10-11-12), ac D (2-3-4-3-5-6-8-9-10) - continue metformin 1000 mg bid  - Alogliptin 25 mg qd - previously with nausea on Victoza- we discussed trying ozempic. she wants to wait for now - we discussed CGMS and pumps, CeQur- she's reluctant at present (taking care of a son with autism, who might rip things off her body) - continue Zocor 10 mg HS - obtain prior DXA records (done in 2020). will refer for another one. - glucose tabs, glucagon kit. RTC 3 months, to see CDE in between.

## 2024-09-28 ENCOUNTER — TRANSCRIPTION ENCOUNTER (OUTPATIENT)
Age: 69
End: 2024-09-28

## 2024-09-28 LAB
25(OH)D3 SERPL-MCNC: 40.2 NG/ML
ALBUMIN SERPL ELPH-MCNC: 4.3 G/DL
ALP BLD-CCNC: 99 U/L
ALT SERPL-CCNC: 8 U/L
ANION GAP SERPL CALC-SCNC: 18 MMOL/L
AST SERPL-CCNC: 14 U/L
BASOPHILS # BLD AUTO: 0.07 K/UL
BASOPHILS NFR BLD AUTO: 0.9 %
BILIRUB SERPL-MCNC: 0.2 MG/DL
BUN SERPL-MCNC: 35 MG/DL
CALCIUM SERPL-MCNC: 9.8 MG/DL
CHLORIDE SERPL-SCNC: 99 MMOL/L
CHOLEST SERPL-MCNC: 156 MG/DL
CO2 SERPL-SCNC: 24 MMOL/L
CREAT SERPL-MCNC: 1.3 MG/DL
EGFR: 45 ML/MIN/1.73M2
EOSINOPHIL # BLD AUTO: 0.07 K/UL
EOSINOPHIL NFR BLD AUTO: 0.9 %
FOLATE SERPL-MCNC: 14.2 NG/ML
GLUCOSE SERPL-MCNC: 112 MG/DL
HCT VFR BLD CALC: 36.5 %
HDLC SERPL-MCNC: 72 MG/DL
HGB BLD-MCNC: 11.1 G/DL
IMM GRANULOCYTES NFR BLD AUTO: 0.2 %
LDLC SERPL CALC-MCNC: 71 MG/DL
LYMPHOCYTES # BLD AUTO: 1.94 K/UL
LYMPHOCYTES NFR BLD AUTO: 23.8 %
MAN DIFF?: NORMAL
MCHC RBC-ENTMCNC: 27.3 PG
MCHC RBC-ENTMCNC: 30.4 GM/DL
MCV RBC AUTO: 89.7 FL
MONOCYTES # BLD AUTO: 0.71 K/UL
MONOCYTES NFR BLD AUTO: 8.7 %
NEUTROPHILS # BLD AUTO: 5.34 K/UL
NEUTROPHILS NFR BLD AUTO: 65.5 %
NONHDLC SERPL-MCNC: 84 MG/DL
PLATELET # BLD AUTO: 285 K/UL
POTASSIUM SERPL-SCNC: 4.3 MMOL/L
PROT SERPL-MCNC: 7.2 G/DL
RBC # BLD: 4.07 M/UL
RBC # FLD: 15.5 %
SODIUM SERPL-SCNC: 141 MMOL/L
T4 FREE SERPL-MCNC: 1.3 NG/DL
TRIGL SERPL-MCNC: 68 MG/DL
TSH SERPL-ACNC: 1.13 UIU/ML
VIT B12 SERPL-MCNC: 242 PG/ML
WBC # FLD AUTO: 8.15 K/UL

## 2024-09-30 LAB
CREAT SPEC-SCNC: 115 MG/DL
MICROALBUMIN 24H UR DL<=1MG/L-MCNC: 1.6 MG/DL
MICROALBUMIN/CREAT 24H UR-RTO: 14 MG/G

## 2024-11-04 ENCOUNTER — APPOINTMENT (OUTPATIENT)
Dept: OBGYN | Facility: CLINIC | Age: 69
End: 2024-11-04
Payer: MEDICARE

## 2024-11-04 ENCOUNTER — TRANSCRIPTION ENCOUNTER (OUTPATIENT)
Age: 69
End: 2024-11-04

## 2024-11-04 PROCEDURE — G0444 DEPRESSION SCREEN ANNUAL: CPT

## 2024-11-04 PROCEDURE — G0101: CPT | Mod: GA

## 2024-11-08 ENCOUNTER — TRANSCRIPTION ENCOUNTER (OUTPATIENT)
Age: 69
End: 2024-11-08

## 2024-11-15 ENCOUNTER — TRANSCRIPTION ENCOUNTER (OUTPATIENT)
Age: 69
End: 2024-11-15

## 2024-11-18 ENCOUNTER — TRANSCRIPTION ENCOUNTER (OUTPATIENT)
Age: 69
End: 2024-11-18

## 2025-01-03 RX ORDER — SITAGLIPTIN 100 MG/1
100 TABLET, FILM COATED ORAL DAILY
Qty: 1 | Refills: 2 | Status: ACTIVE | COMMUNITY
Start: 2025-01-03 | End: 1900-01-01

## 2025-01-06 ENCOUNTER — TRANSCRIPTION ENCOUNTER (OUTPATIENT)
Age: 70
End: 2025-01-06

## 2025-01-10 ENCOUNTER — TRANSCRIPTION ENCOUNTER (OUTPATIENT)
Age: 70
End: 2025-01-10

## 2025-02-14 ENCOUNTER — APPOINTMENT (OUTPATIENT)
Dept: ENDOCRINOLOGY | Facility: CLINIC | Age: 70
End: 2025-02-14
Payer: COMMERCIAL

## 2025-02-14 VITALS
WEIGHT: 131 LBS | HEART RATE: 102 BPM | OXYGEN SATURATION: 98 % | SYSTOLIC BLOOD PRESSURE: 151 MMHG | BODY MASS INDEX: 24.11 KG/M2 | DIASTOLIC BLOOD PRESSURE: 71 MMHG | HEIGHT: 62 IN

## 2025-02-14 DIAGNOSIS — E55.9 VITAMIN D DEFICIENCY, UNSPECIFIED: ICD-10-CM

## 2025-02-14 DIAGNOSIS — Z13.820 ENCOUNTER FOR SCREENING FOR OSTEOPOROSIS: ICD-10-CM

## 2025-02-14 DIAGNOSIS — E78.5 HYPERLIPIDEMIA, UNSPECIFIED: ICD-10-CM

## 2025-02-14 DIAGNOSIS — E11.65 TYPE 2 DIABETES MELLITUS WITH HYPERGLYCEMIA: ICD-10-CM

## 2025-02-14 LAB — HBA1C MFR BLD HPLC: 7.5

## 2025-02-14 PROCEDURE — 99214 OFFICE O/P EST MOD 30 MIN: CPT

## 2025-02-14 PROCEDURE — 36415 COLL VENOUS BLD VENIPUNCTURE: CPT

## 2025-02-14 PROCEDURE — G2211 COMPLEX E/M VISIT ADD ON: CPT

## 2025-02-14 PROCEDURE — 83036 HEMOGLOBIN GLYCOSYLATED A1C: CPT | Mod: QW

## 2025-02-17 ENCOUNTER — TRANSCRIPTION ENCOUNTER (OUTPATIENT)
Age: 70
End: 2025-02-17

## 2025-02-17 LAB
25(OH)D3 SERPL-MCNC: 37.8 NG/ML
ALBUMIN SERPL ELPH-MCNC: 4.1 G/DL
ALP BLD-CCNC: 112 U/L
ALT SERPL-CCNC: 16 U/L
ANION GAP SERPL CALC-SCNC: 13 MMOL/L
AST SERPL-CCNC: 24 U/L
BILIRUB SERPL-MCNC: 0.2 MG/DL
BUN SERPL-MCNC: 44 MG/DL
C PEPTIDE SERPL-MCNC: 0.4 NG/ML
CALCIUM SERPL-MCNC: 9.6 MG/DL
CHLORIDE SERPL-SCNC: 102 MMOL/L
CHOLEST SERPL-MCNC: 157 MG/DL
CO2 SERPL-SCNC: 24 MMOL/L
CREAT SERPL-MCNC: 1.29 MG/DL
CREAT SPEC-SCNC: 131 MG/DL
EGFR: 45 ML/MIN/1.73M2
FOLATE SERPL-MCNC: 18.4 NG/ML
FRUCTOSAMINE SERPL-MCNC: 304 UMOL/L
GLUCOSE SERPL-MCNC: 111 MG/DL
HDLC SERPL-MCNC: 74 MG/DL
LDLC SERPL CALC-MCNC: 68 MG/DL
MICROALBUMIN 24H UR DL<=1MG/L-MCNC: 1.6 MG/DL
MICROALBUMIN/CREAT 24H UR-RTO: 12 MG/G
NONHDLC SERPL-MCNC: 83 MG/DL
POTASSIUM SERPL-SCNC: 4.5 MMOL/L
PROT SERPL-MCNC: 6.9 G/DL
SODIUM SERPL-SCNC: 139 MMOL/L
T4 FREE SERPL-MCNC: 1.2 NG/DL
TRIGL SERPL-MCNC: 77 MG/DL
TSH SERPL-ACNC: 2.1 UIU/ML
VIT B12 SERPL-MCNC: 316 PG/ML

## 2025-02-18 ENCOUNTER — TRANSCRIPTION ENCOUNTER (OUTPATIENT)
Age: 70
End: 2025-02-18

## 2025-02-27 ENCOUNTER — TRANSCRIPTION ENCOUNTER (OUTPATIENT)
Age: 70
End: 2025-02-27

## 2025-03-05 RX ORDER — INSULIN ASPART 100 [IU]/ML
100 INJECTION, SOLUTION INTRAVENOUS; SUBCUTANEOUS
Qty: 3 | Refills: 3 | Status: ACTIVE | COMMUNITY
Start: 2025-03-05 | End: 1900-01-01

## 2025-04-09 RX ORDER — INSULIN ASPART 100 [IU]/ML
100 INJECTION, SOLUTION INTRAVENOUS; SUBCUTANEOUS
Qty: 5 | Refills: 6 | Status: ACTIVE | COMMUNITY
Start: 2025-04-09 | End: 1900-01-01

## 2025-06-06 ENCOUNTER — APPOINTMENT (OUTPATIENT)
Dept: ENDOCRINOLOGY | Facility: CLINIC | Age: 70
End: 2025-06-06

## 2025-06-06 VITALS
RESPIRATION RATE: 17 BRPM | TEMPERATURE: 98.3 F | OXYGEN SATURATION: 97 % | DIASTOLIC BLOOD PRESSURE: 86 MMHG | WEIGHT: 139.5 LBS | SYSTOLIC BLOOD PRESSURE: 137 MMHG | BODY MASS INDEX: 25.67 KG/M2 | HEART RATE: 104 BPM | HEIGHT: 62 IN

## 2025-06-06 LAB — GLUCOSE BLDC GLUCOMTR-MCNC: 192

## 2025-06-06 PROCEDURE — 36415 COLL VENOUS BLD VENIPUNCTURE: CPT

## 2025-06-06 PROCEDURE — G2211 COMPLEX E/M VISIT ADD ON: CPT | Mod: NC

## 2025-06-06 PROCEDURE — 83036 HEMOGLOBIN GLYCOSYLATED A1C: CPT | Mod: QW

## 2025-06-06 PROCEDURE — 99204 OFFICE O/P NEW MOD 45 MIN: CPT

## 2025-06-06 PROCEDURE — 82962 GLUCOSE BLOOD TEST: CPT

## 2025-06-09 ENCOUNTER — TRANSCRIPTION ENCOUNTER (OUTPATIENT)
Age: 70
End: 2025-06-09

## 2025-06-09 LAB
25(OH)D3 SERPL-MCNC: 39.9 NG/ML
ALBUMIN SERPL ELPH-MCNC: 4.2 G/DL
ALP BLD-CCNC: 118 U/L
ALT SERPL-CCNC: 14 U/L
ANION GAP SERPL CALC-SCNC: 15 MMOL/L
AST SERPL-CCNC: 22 U/L
BILIRUB SERPL-MCNC: 0.3 MG/DL
BUN SERPL-MCNC: 42 MG/DL
CALCIUM SERPL-MCNC: 9.7 MG/DL
CHLORIDE SERPL-SCNC: 103 MMOL/L
CHOLEST SERPL-MCNC: 163 MG/DL
CO2 SERPL-SCNC: 22 MMOL/L
CREAT SERPL-MCNC: 1.42 MG/DL
CREAT SPEC-SCNC: 124 MG/DL
EGFRCR SERPLBLD CKD-EPI 2021: 41 ML/MIN/1.73M2
GLUCOSE SERPL-MCNC: 166 MG/DL
HBA1C MFR BLD HPLC: 7.4
HDLC SERPL-MCNC: 77 MG/DL
LDLC SERPL-MCNC: 72 MG/DL
MICROALBUMIN 24H UR DL<=1MG/L-MCNC: <1.2 MG/DL
MICROALBUMIN/CREAT 24H UR-RTO: NORMAL MG/G
NONHDLC SERPL-MCNC: 86 MG/DL
POTASSIUM SERPL-SCNC: 4.7 MMOL/L
PROT SERPL-MCNC: 7.5 G/DL
SODIUM SERPL-SCNC: 140 MMOL/L
TRIGL SERPL-MCNC: 72 MG/DL
TSH SERPL-ACNC: 0.84 UIU/ML

## 2025-07-17 ENCOUNTER — TRANSCRIPTION ENCOUNTER (OUTPATIENT)
Age: 70
End: 2025-07-17

## 2025-08-06 ENCOUNTER — TRANSCRIPTION ENCOUNTER (OUTPATIENT)
Age: 70
End: 2025-08-06

## 2025-08-21 ENCOUNTER — TRANSCRIPTION ENCOUNTER (OUTPATIENT)
Age: 70
End: 2025-08-21

## 2025-09-10 ENCOUNTER — TRANSCRIPTION ENCOUNTER (OUTPATIENT)
Age: 70
End: 2025-09-10